# Patient Record
Sex: FEMALE | Race: BLACK OR AFRICAN AMERICAN | NOT HISPANIC OR LATINO | Employment: FULL TIME | URBAN - METROPOLITAN AREA
[De-identification: names, ages, dates, MRNs, and addresses within clinical notes are randomized per-mention and may not be internally consistent; named-entity substitution may affect disease eponyms.]

---

## 2017-07-28 ENCOUNTER — HISTORICAL (OUTPATIENT)
Dept: FAMILY MEDICINE | Facility: CLINIC | Age: 52
End: 2017-07-28

## 2017-07-28 LAB
ALBUMIN SERPL-MCNC: 3.5 GM/DL (ref 3.4–5)
ALBUMIN/GLOB SERPL: 1 RATIO (ref 1–2)
ALP SERPL-CCNC: 101 UNIT/L (ref 45–117)
ALT SERPL-CCNC: 26 UNIT/L (ref 12–78)
AST SERPL-CCNC: 17 UNIT/L (ref 15–37)
BILIRUB SERPL-MCNC: 0.5 MG/DL (ref 0.2–1)
BILIRUBIN DIRECT+TOT PNL SERPL-MCNC: 0.2 MG/DL
BILIRUBIN DIRECT+TOT PNL SERPL-MCNC: 0.3 MG/DL
BUN SERPL-MCNC: 11 MG/DL (ref 7–18)
CALCIUM SERPL-MCNC: 8.1 MG/DL (ref 8.5–10.1)
CHLORIDE SERPL-SCNC: 108 MMOL/L (ref 98–107)
CHOLEST SERPL-MCNC: 117 MG/DL
CHOLEST/HDLC SERPL: 3.1 {RATIO} (ref 0–4.4)
CO2 SERPL-SCNC: 27 MMOL/L (ref 21–32)
CREAT SERPL-MCNC: 1 MG/DL (ref 0.6–1.3)
DEPRECATED CALCIDIOL+CALCIFEROL SERPL-MC: 7.24 NG/ML (ref 30–80)
EST. AVERAGE GLUCOSE BLD GHB EST-MCNC: 128 MG/DL
GLOBULIN SER-MCNC: 4.2 GM/ML (ref 2.3–3.5)
GLUCOSE SERPL-MCNC: 114 MG/DL (ref 74–106)
HBA1C MFR BLD: 6.1 % (ref 4.2–6.3)
HDLC SERPL-MCNC: 38 MG/DL
LDLC SERPL CALC-MCNC: 64 MG/DL (ref 0–130)
POTASSIUM SERPL-SCNC: 3.1 MMOL/L (ref 3.5–5.1)
PROT SERPL-MCNC: 7.7 GM/DL (ref 6.4–8.2)
SODIUM SERPL-SCNC: 144 MMOL/L (ref 136–145)
TRIGL SERPL-MCNC: 77 MG/DL
TSH SERPL-ACNC: 2.51 MIU/L (ref 0.36–3.74)
VLDLC SERPL CALC-MCNC: 15 MG/DL

## 2017-08-31 ENCOUNTER — HISTORICAL (OUTPATIENT)
Dept: RADIOLOGY | Facility: HOSPITAL | Age: 52
End: 2017-08-31

## 2017-08-31 ENCOUNTER — HISTORICAL (OUTPATIENT)
Dept: ADMINISTRATIVE | Facility: HOSPITAL | Age: 52
End: 2017-08-31

## 2017-09-05 ENCOUNTER — HISTORICAL (OUTPATIENT)
Dept: SURGERY | Facility: HOSPITAL | Age: 52
End: 2017-09-05

## 2017-09-05 LAB — POTASSIUM SERPL-SCNC: 3.6 MMOL/L (ref 3.5–5.1)

## 2017-10-25 ENCOUNTER — HISTORICAL (OUTPATIENT)
Dept: OPHTHALMOLOGY | Facility: CLINIC | Age: 52
End: 2017-10-25

## 2017-11-09 ENCOUNTER — HISTORICAL (OUTPATIENT)
Dept: ENDOSCOPY | Facility: HOSPITAL | Age: 52
End: 2017-11-09

## 2017-12-01 ENCOUNTER — HISTORICAL (OUTPATIENT)
Dept: RADIOLOGY | Facility: HOSPITAL | Age: 52
End: 2017-12-01

## 2017-12-17 ENCOUNTER — HISTORICAL (OUTPATIENT)
Dept: SLEEP MEDICINE | Facility: HOSPITAL | Age: 52
End: 2017-12-17

## 2018-02-02 ENCOUNTER — HISTORICAL (OUTPATIENT)
Dept: ADMINISTRATIVE | Facility: HOSPITAL | Age: 53
End: 2018-02-02

## 2018-02-02 LAB — DEPRECATED CALCIDIOL+CALCIFEROL SERPL-MC: 35.03 NG/ML (ref 30–80)

## 2018-03-09 ENCOUNTER — HISTORICAL (OUTPATIENT)
Dept: RADIOLOGY | Facility: HOSPITAL | Age: 53
End: 2018-03-09

## 2018-05-10 ENCOUNTER — HISTORICAL (OUTPATIENT)
Dept: ADMINISTRATIVE | Facility: HOSPITAL | Age: 53
End: 2018-05-10

## 2018-05-10 LAB
APPEARANCE, UA: CLEAR
BACTERIA #/AREA URNS AUTO: ABNORMAL /[HPF]
BILIRUB SERPL-MCNC: NEGATIVE MG/DL
BILIRUB UR QL STRIP: NEGATIVE
BLOOD URINE, POC: NORMAL
CLARITY, POC UA: CLEAR
COLOR UR: ABNORMAL
COLOR, POC UA: YELLOW
GLUCOSE (UA): NORMAL
GLUCOSE UR QL STRIP: NEGATIVE
HGB UR QL STRIP: NEGATIVE
HYALINE CASTS #/AREA URNS LPF: ABNORMAL /[LPF]
KETONES UR QL STRIP: NEGATIVE
KETONES UR QL STRIP: NEGATIVE
LEUKOCYTE EST, POC UA: NORMAL
LEUKOCYTE ESTERASE UR QL STRIP: 250 LEU/UL
NITRITE UR QL STRIP: NEGATIVE
NITRITE, POC UA: NEGATIVE
PH UR STRIP: 6.5 [PH] (ref 4.5–8)
PH, POC UA: 6.5
PROT UR QL STRIP: NEGATIVE
PROTEIN, POC: NEGATIVE
RBC #/AREA URNS AUTO: ABNORMAL /[HPF]
SP GR UR STRIP: 1.02 (ref 1–1.03)
SPECIFIC GRAVITY, POC UA: 1.02
SQUAMOUS #/AREA URNS LPF: ABNORMAL /[LPF]
UROBILINOGEN UR STRIP-ACNC: 2 MG/DL
UROBILINOGEN, POC UA: NORMAL
WBC #/AREA URNS AUTO: ABNORMAL /HPF

## 2018-05-17 ENCOUNTER — HISTORICAL (OUTPATIENT)
Dept: ADMINISTRATIVE | Facility: HOSPITAL | Age: 53
End: 2018-05-17

## 2018-05-17 LAB
APPEARANCE, UA: CLEAR
BACTERIA #/AREA URNS AUTO: ABNORMAL /[HPF]
BILIRUB UR QL STRIP: NEGATIVE
COLOR UR: YELLOW
GLUCOSE (UA): NORMAL
HGB UR QL STRIP: NEGATIVE
HYALINE CASTS #/AREA URNS LPF: ABNORMAL /[LPF]
KETONES UR QL STRIP: ABNORMAL
LEUKOCYTE ESTERASE UR QL STRIP: NEGATIVE
NITRITE UR QL STRIP: NEGATIVE
PH UR STRIP: 6 [PH] (ref 4.5–8)
PROT UR QL STRIP: 20 MG/DL
RBC #/AREA URNS AUTO: ABNORMAL /[HPF]
SP GR UR STRIP: 1.03 (ref 1–1.03)
SQUAMOUS #/AREA URNS LPF: ABNORMAL /[LPF]
UROBILINOGEN UR STRIP-ACNC: 8 MG/DL
WBC #/AREA URNS AUTO: ABNORMAL /HPF

## 2018-05-19 LAB — FINAL CULTURE: NO GROWTH

## 2018-06-05 ENCOUNTER — HISTORICAL (OUTPATIENT)
Dept: ADMINISTRATIVE | Facility: HOSPITAL | Age: 53
End: 2018-06-05

## 2018-06-05 LAB
ALBUMIN SERPL-MCNC: 3.8 GM/DL (ref 3.4–5)
ALBUMIN/GLOB SERPL: 1 RATIO (ref 1–2)
ALP SERPL-CCNC: 98 UNIT/L (ref 45–117)
ALT SERPL-CCNC: 25 UNIT/L (ref 12–78)
AST SERPL-CCNC: 12 UNIT/L (ref 15–37)
BILIRUB SERPL-MCNC: 0.4 MG/DL (ref 0.2–1)
BILIRUBIN DIRECT+TOT PNL SERPL-MCNC: 0.2 MG/DL
BILIRUBIN DIRECT+TOT PNL SERPL-MCNC: 0.2 MG/DL
BUN SERPL-MCNC: 12 MG/DL (ref 7–18)
CALCIUM SERPL-MCNC: 9.2 MG/DL (ref 8.5–10.1)
CHLORIDE SERPL-SCNC: 113 MMOL/L (ref 98–107)
CO2 SERPL-SCNC: 22 MMOL/L (ref 21–32)
CREAT SERPL-MCNC: 0.9 MG/DL (ref 0.6–1.3)
EST. AVERAGE GLUCOSE BLD GHB EST-MCNC: 105 MG/DL
GLOBULIN SER-MCNC: 4.1 GM/ML (ref 2.3–3.5)
GLUCOSE SERPL-MCNC: 93 MG/DL (ref 74–106)
HBA1C MFR BLD: 5.3 % (ref 4.2–6.3)
POTASSIUM SERPL-SCNC: 3.6 MMOL/L (ref 3.5–5.1)
PROT SERPL-MCNC: 7.9 GM/DL (ref 6.4–8.2)
SODIUM SERPL-SCNC: 144 MMOL/L (ref 136–145)
VIT B12 SERPL-MCNC: 225 PG/ML (ref 193–986)

## 2018-07-07 LAB
BILIRUB SERPL-MCNC: NEGATIVE MG/DL
BLOOD URINE, POC: NORMAL
CLARITY, POC UA: NORMAL
COLOR, POC UA: YELLOW
GLUCOSE UR QL STRIP: NEGATIVE
KETONES UR QL STRIP: NEGATIVE
LEUKOCYTE EST, POC UA: NORMAL
NITRITE, POC UA: NEGATIVE
PH, POC UA: 7
PROTEIN, POC: NORMAL
SPECIFIC GRAVITY, POC UA: 1.02
UROBILINOGEN, POC UA: NORMAL

## 2018-08-16 ENCOUNTER — HISTORICAL (OUTPATIENT)
Dept: ADMINISTRATIVE | Facility: HOSPITAL | Age: 53
End: 2018-08-16

## 2018-08-16 LAB
APPEARANCE, UA: ABNORMAL
BACTERIA #/AREA URNS AUTO: ABNORMAL /[HPF]
BILIRUB UR QL STRIP: NEGATIVE
COLOR UR: YELLOW
GLUCOSE (UA): NORMAL
HGB UR QL STRIP: NEGATIVE
HYALINE CASTS #/AREA URNS LPF: ABNORMAL /[LPF]
KETONES UR QL STRIP: NEGATIVE
LEUKOCYTE ESTERASE UR QL STRIP: 500 LEU/UL
NITRITE UR QL STRIP: NEGATIVE
PH UR STRIP: 6 [PH] (ref 4.5–8)
PROT UR QL STRIP: 30 MG/DL
RBC #/AREA URNS AUTO: ABNORMAL /[HPF]
SP GR UR STRIP: 1.02 (ref 1–1.03)
SQUAMOUS #/AREA URNS LPF: >100 /[LPF]
UROBILINOGEN UR STRIP-ACNC: 4 MG/DL
WBC #/AREA URNS AUTO: >=100 /HPF

## 2018-12-21 ENCOUNTER — HISTORICAL (OUTPATIENT)
Dept: ADMINISTRATIVE | Facility: HOSPITAL | Age: 53
End: 2018-12-21

## 2019-01-04 ENCOUNTER — HISTORICAL (OUTPATIENT)
Dept: FAMILY MEDICINE | Facility: CLINIC | Age: 54
End: 2019-01-04

## 2019-01-04 LAB
CHOLEST SERPL-MCNC: 147 MG/DL
CHOLEST/HDLC SERPL: 3.5 {RATIO} (ref 0–4.4)
EST. AVERAGE GLUCOSE BLD GHB EST-MCNC: 140 MG/DL
HBA1C MFR BLD: 6.5 % (ref 4.2–6.3)
HDLC SERPL-MCNC: 42 MG/DL
LDLC SERPL CALC-MCNC: 87 MG/DL (ref 0–130)
TRIGL SERPL-MCNC: 91 MG/DL
VLDLC SERPL CALC-MCNC: 18 MG/DL

## 2019-03-21 ENCOUNTER — HISTORICAL (OUTPATIENT)
Dept: ADMINISTRATIVE | Facility: HOSPITAL | Age: 54
End: 2019-03-21

## 2019-03-21 LAB
CREAT UR-MCNC: 180 MG/DL
MICROALBUMIN UR-MCNC: 9.6 MG/L (ref 0–19)
MICROALBUMIN/CREAT RATIO PNL UR: 5.3 MCG/MG CR (ref 0–29)

## 2019-06-03 LAB
BILIRUB SERPL-MCNC: NEGATIVE MG/DL
BLOOD URINE, POC: NORMAL
CLARITY, POC UA: CLEAR
COLOR, POC UA: YELLOW
GLUCOSE UR QL STRIP: NEGATIVE
KETONES UR QL STRIP: NEGATIVE
LEUKOCYTE EST, POC UA: NEGATIVE
NITRITE, POC UA: NEGATIVE
PH, POC UA: 5.5
PROTEIN, POC: NEGATIVE
SPECIFIC GRAVITY, POC UA: 1.02
UROBILINOGEN, POC UA: NORMAL

## 2019-06-14 ENCOUNTER — HISTORICAL (OUTPATIENT)
Dept: INTERNAL MEDICINE | Facility: CLINIC | Age: 54
End: 2019-06-14

## 2019-06-14 LAB
EST. AVERAGE GLUCOSE BLD GHB EST-MCNC: 126 MG/DL
HBA1C MFR BLD: 6 % (ref 4.2–6.3)

## 2019-07-11 ENCOUNTER — HISTORICAL (OUTPATIENT)
Dept: RADIOLOGY | Facility: HOSPITAL | Age: 54
End: 2019-07-11

## 2019-07-11 LAB
ALBUMIN SERPL-MCNC: 3.6 GM/DL (ref 3.4–5)
ALBUMIN/GLOB SERPL: 0.9 RATIO (ref 1.1–2)
ALP SERPL-CCNC: 97 UNIT/L (ref 45–117)
ALT SERPL-CCNC: 31 UNIT/L (ref 12–78)
AST SERPL-CCNC: 18 UNIT/L (ref 15–37)
BILIRUB SERPL-MCNC: 0.4 MG/DL (ref 0.2–1)
BILIRUBIN DIRECT+TOT PNL SERPL-MCNC: 0.1 MG/DL
BILIRUBIN DIRECT+TOT PNL SERPL-MCNC: 0.3 MG/DL
BUN SERPL-MCNC: 18 MG/DL (ref 7–18)
CALCIUM SERPL-MCNC: 8.8 MG/DL (ref 8.5–10.1)
CHLORIDE SERPL-SCNC: 114 MMOL/L (ref 98–107)
CO2 SERPL-SCNC: 25 MMOL/L (ref 21–32)
CREAT SERPL-MCNC: 1 MG/DL (ref 0.6–1.3)
GLOBULIN SER-MCNC: 4.1 GM/ML (ref 2.3–3.5)
GLUCOSE SERPL-MCNC: 106 MG/DL (ref 74–106)
POTASSIUM SERPL-SCNC: 3.8 MMOL/L (ref 3.5–5.1)
PROT SERPL-MCNC: 7.7 GM/DL (ref 6.4–8.2)
SODIUM SERPL-SCNC: 145 MMOL/L (ref 136–145)

## 2019-09-20 ENCOUNTER — HISTORICAL (OUTPATIENT)
Dept: RADIOLOGY | Facility: HOSPITAL | Age: 54
End: 2019-09-20

## 2020-06-10 ENCOUNTER — HISTORICAL (OUTPATIENT)
Dept: CARDIOLOGY | Facility: HOSPITAL | Age: 55
End: 2020-06-10

## 2020-06-12 ENCOUNTER — HISTORICAL (OUTPATIENT)
Dept: ADMINISTRATIVE | Facility: HOSPITAL | Age: 55
End: 2020-06-12

## 2020-06-12 LAB
BUN SERPL-MCNC: 14 MG/DL (ref 7–18)
CALCIUM SERPL-MCNC: 8.8 MG/DL (ref 8.5–10.1)
CHLORIDE SERPL-SCNC: 112 MMOL/L (ref 98–107)
CHOLEST SERPL-MCNC: 126 MG/DL
CHOLEST/HDLC SERPL: 3.2 {RATIO} (ref 0–4.4)
CO2 SERPL-SCNC: 27 MMOL/L (ref 21–32)
CREAT SERPL-MCNC: 0.9 MG/DL (ref 0.6–1.3)
CREAT UR-MCNC: 273 MG/DL
CREAT/UREA NIT SERPL: 16
EST. AVERAGE GLUCOSE BLD GHB EST-MCNC: 171 MG/DL
GLUCOSE SERPL-MCNC: 108 MG/DL (ref 74–106)
HBA1C MFR BLD: 7.6 % (ref 4.2–6.3)
HDLC SERPL-MCNC: 40 MG/DL (ref 40–59)
LDLC SERPL CALC-MCNC: 70 MG/DL
POTASSIUM SERPL-SCNC: 3.5 MMOL/L (ref 3.5–5.1)
PROT UR STRIP-MCNC: 25.7 MG/DL
PROT/CREAT UR-RTO: 94.1 MG/GM
SODIUM SERPL-SCNC: 145 MMOL/L (ref 136–145)
TRIGL SERPL-MCNC: 82 MG/DL
VLDLC SERPL CALC-MCNC: 16 MG/DL

## 2020-07-06 ENCOUNTER — HISTORICAL (OUTPATIENT)
Dept: ADMINISTRATIVE | Facility: HOSPITAL | Age: 55
End: 2020-07-06

## 2020-07-17 ENCOUNTER — HISTORICAL (OUTPATIENT)
Dept: RADIOLOGY | Facility: HOSPITAL | Age: 55
End: 2020-07-17

## 2020-08-18 ENCOUNTER — HISTORICAL (OUTPATIENT)
Dept: ADMINISTRATIVE | Facility: HOSPITAL | Age: 55
End: 2020-08-18

## 2020-08-20 LAB — FINAL CULTURE: NORMAL

## 2021-01-03 ENCOUNTER — HISTORICAL (OUTPATIENT)
Dept: ADMINISTRATIVE | Facility: HOSPITAL | Age: 56
End: 2021-01-03

## 2021-01-03 LAB
FLUAV AG UPPER RESP QL IA.RAPID: NEGATIVE
FLUBV AG UPPER RESP QL IA.RAPID: NEGATIVE
SARS-COV-2 RNA RESP QL NAA+PROBE: DETECTED

## 2021-01-26 ENCOUNTER — HISTORICAL (OUTPATIENT)
Dept: ADMINISTRATIVE | Facility: HOSPITAL | Age: 56
End: 2021-01-26

## 2021-02-08 ENCOUNTER — HISTORICAL (OUTPATIENT)
Dept: FAMILY MEDICINE | Facility: CLINIC | Age: 56
End: 2021-02-08

## 2021-02-08 LAB
ALBUMIN SERPL-MCNC: 3.8 GM/DL (ref 3.5–5)
ALBUMIN/GLOB SERPL: 1 RATIO (ref 1.1–2)
ALP SERPL-CCNC: 93 UNIT/L (ref 40–150)
ALT SERPL-CCNC: 21 UNIT/L (ref 0–55)
AST SERPL-CCNC: 17 UNIT/L (ref 5–34)
BILIRUB SERPL-MCNC: 0.5 MG/DL
BILIRUBIN DIRECT+TOT PNL SERPL-MCNC: 0.2 MG/DL (ref 0–0.5)
BILIRUBIN DIRECT+TOT PNL SERPL-MCNC: 0.3 MG/DL (ref 0–0.8)
BUN SERPL-MCNC: 14 MG/DL (ref 9.8–20.1)
CALCIUM SERPL-MCNC: 9.5 MG/DL (ref 8.4–10.2)
CHLORIDE SERPL-SCNC: 111 MMOL/L (ref 98–107)
CHOLEST SERPL-MCNC: 175 MG/DL
CHOLEST/HDLC SERPL: 4 {RATIO} (ref 0–5)
CO2 SERPL-SCNC: 25 MMOL/L (ref 22–29)
CREAT SERPL-MCNC: 0.92 MG/DL (ref 0.55–1.02)
DEPRECATED CALCIDIOL+CALCIFEROL SERPL-MC: 38.4 NG/ML (ref 30–80)
EST. AVERAGE GLUCOSE BLD GHB EST-MCNC: 119.8 MG/DL
GLOBULIN SER-MCNC: 3.8 GM/DL (ref 2.4–3.5)
GLUCOSE SERPL-MCNC: 94 MG/DL (ref 74–100)
HBA1C MFR BLD: 5.8 %
HDLC SERPL-MCNC: 44 MG/DL (ref 35–60)
LDLC SERPL CALC-MCNC: 119 MG/DL (ref 50–140)
POTASSIUM SERPL-SCNC: 3.4 MMOL/L (ref 3.5–5.1)
PROT SERPL-MCNC: 7.6 GM/DL (ref 6.4–8.3)
SODIUM SERPL-SCNC: 144 MMOL/L (ref 136–145)
TRIGL SERPL-MCNC: 61 MG/DL (ref 37–140)
TSH SERPL-ACNC: 0.92 UIU/ML (ref 0.35–4.94)
VLDLC SERPL CALC-MCNC: 12 MG/DL

## 2021-03-08 ENCOUNTER — HISTORICAL (OUTPATIENT)
Dept: ADMINISTRATIVE | Facility: HOSPITAL | Age: 56
End: 2021-03-08

## 2021-03-08 LAB — CK SERPL-CCNC: 183 U/L (ref 29–168)

## 2021-04-05 ENCOUNTER — HISTORICAL (OUTPATIENT)
Dept: ADMINISTRATIVE | Facility: HOSPITAL | Age: 56
End: 2021-04-05

## 2021-07-31 ENCOUNTER — HISTORICAL (OUTPATIENT)
Dept: ADMINISTRATIVE | Facility: HOSPITAL | Age: 56
End: 2021-07-31

## 2021-07-31 LAB
FLUAV AG UPPER RESP QL IA.RAPID: NEGATIVE
FLUBV AG UPPER RESP QL IA.RAPID: NEGATIVE
SARS-COV-2 RNA RESP QL NAA+PROBE: NOT DETECTED
STREP A PCR (OHS): NOT DETECTED

## 2021-11-10 ENCOUNTER — HISTORICAL (OUTPATIENT)
Dept: ADMINISTRATIVE | Facility: HOSPITAL | Age: 56
End: 2021-11-10

## 2021-11-10 LAB
FLUAV AG UPPER RESP QL IA.RAPID: NEGATIVE
FLUBV AG UPPER RESP QL IA.RAPID: NEGATIVE
SARS-COV-2 RNA RESP QL NAA+PROBE: NEGATIVE
SARS-COV-2 RNA RESP QL NAA+PROBE: NOT DETECTED

## 2021-11-19 ENCOUNTER — HISTORICAL (OUTPATIENT)
Dept: FAMILY MEDICINE | Facility: CLINIC | Age: 56
End: 2021-11-19

## 2021-11-19 LAB
ABS NEUT (OLG): 3.18 X10(3)/MCL (ref 2.1–9.2)
ALBUMIN SERPL-MCNC: 3.7 GM/DL (ref 3.5–5)
ALBUMIN/GLOB SERPL: 1 RATIO (ref 1.1–2)
ALP SERPL-CCNC: 73 UNIT/L (ref 40–150)
ALT SERPL-CCNC: 22 UNIT/L (ref 0–55)
AST SERPL-CCNC: 15 UNIT/L (ref 5–34)
BASOPHILS # BLD AUTO: 0.1 X10(3)/MCL (ref 0–0.2)
BASOPHILS NFR BLD AUTO: 1 %
BILIRUB SERPL-MCNC: 0.5 MG/DL
BILIRUBIN DIRECT+TOT PNL SERPL-MCNC: 0.2 MG/DL (ref 0–0.5)
BILIRUBIN DIRECT+TOT PNL SERPL-MCNC: 0.3 MG/DL (ref 0–0.8)
BUN SERPL-MCNC: 11.4 MG/DL (ref 9.8–20.1)
CALCIUM SERPL-MCNC: 9.5 MG/DL (ref 8.7–10.5)
CHLORIDE SERPL-SCNC: 110 MMOL/L (ref 98–107)
CHOLEST SERPL-MCNC: 185 MG/DL
CHOLEST/HDLC SERPL: 5 {RATIO} (ref 0–5)
CO2 SERPL-SCNC: 27 MMOL/L (ref 22–29)
CREAT SERPL-MCNC: 0.88 MG/DL (ref 0.55–1.02)
DEPRECATED CALCIDIOL+CALCIFEROL SERPL-MC: 46.3 NG/ML (ref 30–80)
EOSINOPHIL # BLD AUTO: 0.4 X10(3)/MCL (ref 0–0.9)
EOSINOPHIL NFR BLD AUTO: 6 %
ERYTHROCYTE [DISTWIDTH] IN BLOOD BY AUTOMATED COUNT: 14.6 % (ref 11.5–14.5)
EST. AVERAGE GLUCOSE BLD GHB EST-MCNC: 122.6 MG/DL
GLOBULIN SER-MCNC: 3.8 GM/DL (ref 2.4–3.5)
GLUCOSE SERPL-MCNC: 106 MG/DL (ref 74–100)
HBA1C MFR BLD: 5.9 %
HCT VFR BLD AUTO: 43.7 % (ref 35–46)
HDLC SERPL-MCNC: 36 MG/DL (ref 35–60)
HGB BLD-MCNC: 13.2 GM/DL (ref 12–16)
IMM GRANULOCYTES # BLD AUTO: 0.03 10*3/UL
IMM GRANULOCYTES NFR BLD AUTO: 0 %
LDLC SERPL CALC-MCNC: 127 MG/DL (ref 50–140)
LYMPHOCYTES # BLD AUTO: 2.7 X10(3)/MCL (ref 0.6–4.6)
LYMPHOCYTES NFR BLD AUTO: 40 %
MCH RBC QN AUTO: 26.2 PG (ref 26–34)
MCHC RBC AUTO-ENTMCNC: 30.2 GM/DL (ref 31–37)
MCV RBC AUTO: 86.9 FL (ref 80–100)
MONOCYTES # BLD AUTO: 0.4 X10(3)/MCL (ref 0.1–1.3)
MONOCYTES NFR BLD AUTO: 6 %
NEUTROPHILS # BLD AUTO: 3.18 X10(3)/MCL (ref 2.1–9.2)
NEUTROPHILS NFR BLD AUTO: 47 %
NRBC BLD AUTO-RTO: 0 % (ref 0–0.2)
PLATELET # BLD AUTO: 305 X10(3)/MCL (ref 130–400)
PMV BLD AUTO: 9.9 FL (ref 7.4–10.4)
POTASSIUM SERPL-SCNC: 3.9 MMOL/L (ref 3.5–5.1)
PROT SERPL-MCNC: 7.5 GM/DL (ref 6.4–8.3)
RBC # BLD AUTO: 5.03 X10(6)/MCL (ref 4–5.2)
SODIUM SERPL-SCNC: 145 MMOL/L (ref 136–145)
T4 FREE SERPL-MCNC: 0.86 NG/DL (ref 0.7–1.48)
TRIGL SERPL-MCNC: 110 MG/DL (ref 37–140)
TSH SERPL-ACNC: 1.03 UIU/ML (ref 0.35–4.94)
VLDLC SERPL CALC-MCNC: 22 MG/DL
WBC # SPEC AUTO: 6.8 X10(3)/MCL (ref 4.5–11)

## 2021-12-23 ENCOUNTER — HISTORICAL (OUTPATIENT)
Dept: RADIOLOGY | Facility: HOSPITAL | Age: 56
End: 2021-12-23

## 2021-12-30 ENCOUNTER — HISTORICAL (OUTPATIENT)
Dept: ADMINISTRATIVE | Facility: HOSPITAL | Age: 56
End: 2021-12-30

## 2021-12-30 LAB — SARS-COV-2 RNA RESP QL NAA+PROBE: POSITIVE

## 2022-04-10 ENCOUNTER — HISTORICAL (OUTPATIENT)
Dept: ADMINISTRATIVE | Facility: HOSPITAL | Age: 57
End: 2022-04-10
Payer: MEDICAID

## 2022-04-30 VITALS
WEIGHT: 293 LBS | OXYGEN SATURATION: 98 % | BODY MASS INDEX: 50.02 KG/M2 | DIASTOLIC BLOOD PRESSURE: 92 MMHG | WEIGHT: 293 LBS | WEIGHT: 293 LBS | DIASTOLIC BLOOD PRESSURE: 82 MMHG | BODY MASS INDEX: 50.02 KG/M2 | WEIGHT: 293 LBS | SYSTOLIC BLOOD PRESSURE: 118 MMHG | SYSTOLIC BLOOD PRESSURE: 139 MMHG | SYSTOLIC BLOOD PRESSURE: 138 MMHG | SYSTOLIC BLOOD PRESSURE: 114 MMHG | OXYGEN SATURATION: 94 % | HEIGHT: 64 IN | BODY MASS INDEX: 48.82 KG/M2 | HEIGHT: 64 IN | DIASTOLIC BLOOD PRESSURE: 70 MMHG | WEIGHT: 293 LBS | SYSTOLIC BLOOD PRESSURE: 97 MMHG | DIASTOLIC BLOOD PRESSURE: 88 MMHG | OXYGEN SATURATION: 97 % | SYSTOLIC BLOOD PRESSURE: 148 MMHG | DIASTOLIC BLOOD PRESSURE: 63 MMHG | BODY MASS INDEX: 50.02 KG/M2 | OXYGEN SATURATION: 96 % | BODY MASS INDEX: 51.91 KG/M2 | DIASTOLIC BLOOD PRESSURE: 76 MMHG | HEIGHT: 64 IN | WEIGHT: 293 LBS | HEIGHT: 65 IN | SYSTOLIC BLOOD PRESSURE: 140 MMHG | HEIGHT: 63 IN | BODY MASS INDEX: 50.02 KG/M2 | DIASTOLIC BLOOD PRESSURE: 81 MMHG | HEIGHT: 64 IN | OXYGEN SATURATION: 98 % | OXYGEN SATURATION: 100 %

## 2022-04-30 NOTE — PROGRESS NOTES
Patient:   Rosalee Prince             MRN: [id number]            FIN: [id number]               Age:   52 years     Sex:  Female     :  1965   Associated Diagnoses:   None   Author:   Diane Barnard MD      Chief Complaint   2018 14:36 CDT      frequent urination and had bladder infect last thursday at urgent care        History of Present Illness   53yo AAF with PMHx of HTN, pseudotumor cerebri  seen at urgent care 5/10/18  diagnosed with UTI, given macrobin, pyridium & flexeril  completed antibiotics  pain never stopped in right lower back  dysuria had resolved, however having more urinary frequency & burning since yesterday  not noticing blood on toilet paper like initially   drinking mayber 4-5 glasses of water/day  does not drink sodas, denies other caffeine use      Review of Systems   Constitutional:  No fever, No chills.    Respiratory:  No shortness of breath, No cough.    Cardiovascular:  No chest pain.    Gastrointestinal:  No nausea, No vomiting, No diarrhea, No constipation, No abdominal pain.    Genitourinary:  Dysuria, No hematuria.    Musculoskeletal:  Back pain.    Neurologic:  No headache.       Physical Examination   Vital Signs   2018 14:36 CDT      Temperature Oral          36.7 DegC                             Temperature Oral (calculated)             98.06 DegF                             Peripheral Pulse Rate     81 bpm                             Respiratory Rate          20 br/min                             SpO2                      98 %                             Systolic Blood Pressure   114 mmHg                             Diastolic Blood Pressure  76 mmHg     General:  Alert and oriented, No acute distress.    Respiratory:  Lungs are clear to auscultation, Respirations are non-labored, Breath sounds are equal, Symmetrical chest wall expansion.    Cardiovascular:  Normal rate, Regular rhythm, No murmur.    Gastrointestinal:  Soft, Non-tender, Non-distended,  Normal bowel sounds.    Genitourinary:  No costovertebral angle tenderness.    Musculoskeletal:  moves all extremitities appropriately & with ease.    Integumentary:  Warm, Dry, Intact.    Neurologic:  No focal deficits.    Psychiatric:  Cooperative.       Impression and Plan   A/P: 53yo AAF    1. UTI  - will treat symptomatically  - Bactrim DS 160mg BID x 7 days  - encourage good hydration  - follow up UA/urine culture & sensitivity; will call with results and DC antibiotics if culture negative    RTC 2 weeks with PCP

## 2022-04-30 NOTE — PROGRESS NOTES
Patient:   Rosalee Prince             MRN: [id number]            FIN: [id number]               Age:   52 years     Sex:  Female     :  1965   Associated Diagnoses:   None   Author:   Moreno Jiang MD      Peer to peer performed, patient qualifies for in house sleep study. Authorization  number provided is [id number]. This authorization is good until .

## 2022-05-02 NOTE — HISTORICAL [facility] CERNER
This is a historical note converted from [place]. Formatting and pictures may have been removed.  Please reference [place] for original formatting and attached multimedia. Chief Complaint  Knee injections  History of Present Illness  bilateral knee pain, last injections lasted till the beginning of this month approximately 2 months; denies any trauma, denies pain elsewhere, no falls, does not feel like legs are giving out on her; did not affect her CBGs last injections  ?  last injections 10/29/20  Review of Systems  -fever, -chills, -fatigue  -Chest pain, -SOB  -n/v/d/c, -abdominal pain  Physical Exam  Vitals & Measurements  T:?36.8? ?C (Oral)? HR:?60(Peripheral)? RR:?18? BP:?138/81? SpO2:?96%?  HT:?162.00?cm? WT:?164.000?kg? BMI:?62.49?  General: well appearing, pleasant  Left Knee: tender anterior joint lines, neg ant drawer, neg post drawer, neg valgus and varus force, strength 5/5 flexion and extension; no swelling, no erythema, no effusion  Right Knee: tender anterior joint lines, neg ant drawer, neg post drawer, neg valgus and varus force, strength 5/5 flexion and extension; no swelling, no erythema, no effusion  ?  Procedure?  Joint injection procedure?  Confirmed: patient, procedure, side, site, safety procedures followed. ??  Supervised?by:?Dr. Saunders  Informed consent: signed by patient. ??  Indication: symptomatic relief of?bilateral?knee?pain. ??  Location:?left and right?knee  Preparation and technique: skin prep chlorhexidine gluconate (Hibiclens) scrub, sterile preparation of site in usual fashion, local anesthesia vapocoolant, approach anterolateral, sterile needle used (size #25??gauge, length 1.5 inch)?  Joint injected: with?4 mL of 1% lidocaine plain with?40?mg of kenalog. ??  Procedure tolerated: well. ??  No Complications.  Blood Loss: none ??  ?  Counseled: ?The patient regarding diagnosis, treatment, and medications.??  ?   PE Post Injection  Post Injection Evaluation: patient reports decrease  in pain  Assessment/Plan  1.?Osteoarthritis of knees, bilateral?M17.0  -bilateral knee injections today  -Bilateral xrays ordered and patient instructed to?go to radiology today  -Left xray from 6/2020 reviewed; Right xray from 12/2018 reviewed  Ordered:  Lidocaine inj., 10 mL, form: Soln, Intra-Articular, Once, first dose 01/26/21 10:27:00 CST, stop date 01/26/21 10:27:00 CST  Clinic Follow up, *Est. 04/26/21 3:00:00 CDT, with PCP Dr. Laboy, Order for future visit, Osteoarthritis of knees, bilateral, [facility]  XR Knee Left 4 or More Views, Routine, 01/26/21 9:51:00 CST, None, Ambulatory, Rad Type, Order for future visit, Osteoarthritis of knees, bilateral, Not Scheduled, 01/26/21 9:51:00 CST  XR Knee Right 4 or More Views, Routine, 01/26/21 9:51:00 CST, None, Ambulatory, Rad Type, Order for future visit, Osteoarthritis of knees, bilateral, Not Scheduled, 01/26/21 9:51:00 CST  ?  RTC 3 months with PCP for routine followup  ?  Jovana Anne,   [employer] [facility] Resident HO-2  Referrals  Clinic Follow up, *Est. 04/26/21 3:00:00 CDT, with PCP Dr. Laboy, Order for future visit, Osteoarthritis of knees, bilateral, [facility]   Problem List/Past Medical History  Ongoing  Controlled diabetes mellitus  HTN (hypertension)(  Confirmed  )  Intracranial hypertension  Leg edema  Morbid obesity  Osteoarthritis of left hip  Spasm of muscle(  Confirmed  )  Well controlled diabetes mellitus  Procedure/Surgical History  Colorectal cancer screening; flexible sigmoidoscopy (11/09/2017)  Inspection of Lower Intestinal Tract, Via Natural or Artificial Opening Endoscopic (11/09/2017)  Sigmoidoscopy (11/09/2017)  Drainage of Spinal Canal, Percutaneous Approach, Diagnostic (09/05/2017)  Spinal puncture, lumbar, diagnostic (09/05/2017)  Drainage of Spinal Canal, Percutaneous Approach, Diagnostic (08/09/2017)  Spinal puncture, lumbar, diagnostic (08/09/2017)  GALL BLADDER  T&A  MILTON - Total abdominal  hysterectomy  TUB. LIGATION.   Medications  acetaZOLAMIDE 500 mg oral capsule, extended release, See Instructions  albuterol 0.083% inhalation solution, 2.5 mg= 3 mL, NEB, q4hr  albuterol CFC free 90 mcg/inh inhalation aerosol with adapter, See Instructions, 4 refills  amlodipine 5 mg oral tablet, See Instructions  Arnuity Ellipta 100 mcg inhalation powder, See Instructions  atorvastatin 20 mg oral tablet, 20 mg= 1 tab(s), Oral, Daily, 3 refills  diclofenac 1% topical gel, TOP, QID  diclofenac sodium 75 mg oral delayed release tablet, 75 mg= 1 tab(s), Oral, BID  Glucometer lancets, See Instructions, 3 refills  Glucometer Test Strips, See Instructions, 3 refills  hydroCHLOROthiazide 12.5 mg oral capsule, See Instructions  Lidocaine 1% 10ml inj, 10 mL, Intra-Articular, Once  metformin 500 mg oral tablet, See Instructions  mupirocin 2% topical ointment, 1 maxx, Both Nostrils, TID  Nebulizer device, See Instructions  polyethylene glycol 3350 oral powder for reconstitution, 17 gm, Oral, Daily, 1 refills  True Metrix Glucometer, See Instructions  valsartan 160 mg oral tablet, See Instructions  Allergies  No Known Allergies  No Known Medication Allergies  Family History  CAD (coronary artery disease): Mother and Father.  Diabetes mellitus type 1.: Sister.  Hodgkins disease: Sister.  Renal failure syndrome.: Mother.  Stroke: Mother and Sister.  Tobacco abuse: Mother and Father.      ?  I reviewed History, PE, A/P and chart was reviewed.  I agree with resident, care reasonable and appropriate.?  resident messaged to place lab orders and notify patient  MA messaged to schedule patient for check up for DM and chronic conditions

## 2022-05-02 NOTE — HISTORICAL [facility] CERNER
This is a historical note converted from [place]. Formatting and pictures may have been removed.  Please reference [place] for original formatting and attached multimedia. Chief Complaint  here for injection left knee.  History of Present Illness  55yo PMHx HTN, OA left hip, Asthma, CTS presents to clinic for injection in left knee.  ?   Received steroid injection in Right knee 6/23/20 without issues, right knee is doing well less pain, walking has been easier, blood sugar did increase after injection. Left knee hurts all the time, lateral and across mostly, the pain keeps her from doing daily activities and limits what she can do, pain today 3/10, but hasnt been moving around today. States pain worse when on her feet and walking around, better with rest, denies stiffness on standing.  Review of Systems  Constitutional:??-fever, -chills, -fatigue??  Musculoskeletal:?left knee pain, left hip pain  Integumentary:??-rash, -breakdown  Neurologic:?-focal deficits, -numbness, -tingling  Physical Exam  Vitals & Measurements  T:?36.8? ?C (Oral)? HR:?53(Peripheral)? RR:?20? BP:?118/70? SpO2:?100%?  HT:?162?cm? WT:?155.80?kg? BMI:?59.37?  General: appears well, in no acute distress, obese, limping gait  Cardiovascular: RRR, no murmurs  Respiration: nonlabored on room air, clear to auscultation  Musculoskeletal:?Left Knee: tender lateral joint line, no erythema, no swelling, no atrophy, no effusion, no warmth, no crepitus; anterior drawer negative,?posterior drawer negative, valgus and varus negative  ?   Procedure?  Joint injection procedure?  Confirmed: patient, procedure, side, site, safety procedures followed. ??  Supervised?by:?Dr. Goodson  Informed consent: signed by patient. ??  Indication: symptomatic relief of?left?knee?pain. ??  Location:??left?knee  Preparation and technique: skin prep chlorhexidine gluconate (Hibiclens) scrub, sterile preparation of site in usual fashion, local anesthesia vapocoolant, approach  anterolateral, sterile needle used (size # 21 ?gauge, length 1.5 inch)?  Joint injected: with?4 mL of 1% lidocaine plain with?40?mg of kenalog. ??  Procedure tolerated: well. ??  No Complications.  Blood Loss: none ??  ?  Counseled: ?The patient regarding diagnosis, treatment, and medications. ?  Patient Instructions: ?given at time of discharge; ER and RTC instructions given?  ?   PE Post Injection  Post Injection Evaluation: patient reports decrease in pain; improvement in ROM; NVI post procedure  ?  Assessment/Plan  1.?Knee pain, left?M25.562  -knee injection today  -XR today reviewed pending official read; my interpretation: narrowing joint space, hyper-sclerotic changes to medial and lateral compartments  -Educated on post procedure care, and discussed monitoring blood sugars  -likely will need to space out steroid injections in the future ad possible hyaluronate injections in the future  Ordered:  Lidocaine inj., 4 mL, form: Injection, IM, Once-Unscheduled, first dose 07/06/20 10:18:00 CDT  triamcinolone, 40 mg, form: Injection, IM, Once-Unscheduled, first dose 07/06/20 10:17:00 CDT  Clinic Follow up, *Est. 10/06/20 3:00:00 CDT, with PCP Iris, Order for future visit, Knee pain, left, [facility]  XR Knee Left 1 or 2 Views, Routine, 07/06/20 9:01:00 CDT, Pain, None, Ambulatory, M25.562, Not Scheduled, 07/06/20 9:01:00 CDT  ?  RTC in 3 months with PCP for routine health maintenance and repeat knee injections.  ?  Jovana Anne DO  [facility] Resident HO-2  Referrals  Clinic Follow up, *Est. 10/06/20 3:00:00 CDT, with PCP Iris, Order for future visit, Knee pain, left, [facility]   Problem List/Past Medical History  Ongoing  Allergic rhinitis(  Confirmed  )  Asthma  Calcaneal spur(  Confirmed  )  Carpal tunnel syndrome, bilateral  Controlled diabetes mellitus  Depression  HTN (hypertension)(  Confirmed  )  Intracranial hypertension  Leg edema  Morbid  obesity  Osteoarthritis of left hip  Spasm of muscle(  Confirmed  )  Well controlled diabetes mellitus  Historical  Essential hypertension  Procedure/Surgical History  Colorectal cancer screening; flexible sigmoidoscopy (11/09/2017)  Inspection of Lower Intestinal Tract, Via Natural or Artificial Opening Endoscopic (11/09/2017)  Sigmoidoscopy (11/09/2017)  Drainage of Spinal Canal, Percutaneous Approach, Diagnostic (09/05/2017)  Spinal puncture, lumbar, diagnostic (09/05/2017)  Drainage of Spinal Canal, Percutaneous Approach, Diagnostic (08/09/2017)  Spinal puncture, lumbar, diagnostic (08/09/2017)  GALL BLADDER  T&A  [facility]ILTON - Total abdominal hysterectomy  TUB. LIGATION.   Medications  albuterol CFC free 90 mcg/inh inhalation aerosol with adapter, See Instructions, 4 refills  Arnuity Ellipta 100 mcg inhalation powder, See Instructions, 4 refills  atorvastatin 20 mg oral tablet, 20 mg= 1 tab(s), Oral, Daily, 3 refills  Diamox Sequels 500 mg oral capsule, extended release, 500 mg= 1 cap(s), Oral, BID, 3 refills  Glucometer lancets, See Instructions, 3 refills  Glucometer Test Strips, See Instructions, 3 refills  hydroCHLOROthiazide 12.5 mg oral capsule, See Instructions  Kenalog-40, 40 mg= 1 mL, IM, Once-Unscheduled  Lidocaine 1% PF 5ml inj, 4 mL, IM, Once-Unscheduled  metformin 500 mg oral tablet, 500 mg= 1 tab(s), Oral, BID, 2 refills  Nebulizer device, See Instructions  Norvasc 5 mg oral tablet, 5 mg= 1 tab(s), Oral, Daily  polyethylene glycol 3350 oral powder for reconstitution, 17 gm, Oral, Daily, 1 refills  True Metrix Glucometer, See Instructions  valsartan 160 mg oral tablet, See Instructions, 2 refills  Voltaren 1% topical gel, 1 maxx, TOP, QID, PRN,? ?Not taking  Allergies  No Known Medication Allergies  Social History  Abuse/Neglect  No, No, 07/06/2020  No, No, Yes, 06/12/2020  No, 06/03/2020  Alcohol - Low Risk, 04/26/2015  Never, 10/18/2019  Employment/School  Employed, 12/21/2018  Exercise  Exercise  duration: 0., 12/21/2018  Home/Environment  Lives with Spouse, grandchildren. Alcohol abuse in household: No. Substance abuse in household: No. Smoker in household: No. Injuries/Abuse/Neglect in household: No. Feels unsafe at home: No. Safe place to go: No. Agency(s)/Others notified: No. TV/Computer concerns: No., 12/21/2018  Nutrition/Health  Regular, 12/21/2018  Sexual  Gender Identity Identifies as female., 10/22/2019  Spiritual/Cultural  Synagogue, 10/18/2019  Substance Use - Denies Substance Abuse, 04/26/2015  Never, Household substance abuse concerns: No., 07/06/2020  Tobacco - Denies Tobacco Use, 07/01/2015  Former smoker, quit more than 30 days ago, N/A, Household tobacco concerns: No. Smokeless Tobacco Use: Never., 07/06/2020  Family History  CAD (coronary artery disease): Mother and Father.  Diabetes mellitus type 1.: Sister.  Hodgkins disease: Sister.  Renal failure syndrome.: Mother.  Stroke: Mother and Sister.  Tobacco abuse: Mother and Father.  Immunizations  Vaccine Date Status   influenza virus vaccine, inactivated 10/18/2019 Given   tetanus-diphtheria toxoids 06/20/2019 Given   influenza virus vaccine, inactivated 11/02/2018 Given   influenza virus vaccine, inactivated 03/08/2018 Given   influenza virus vaccine, inactivated 10/23/2014 Recorded   Health Maintenance  Health Maintenance  ???Pending?(in the next year)  ??? ??OverDue  ??? ? ? ?Diabetes Maintenance-Microalbumin due??and every?  ??? ? ? ?Hypertension Management-Education due??11/11/17??and every 1??year(s)  ??? ? ? ?Asthma Management-Spirometry due??03/09/19??and every 1??year(s)  ??? ??Due?  ??? ? ? ?Aspirin Therapy for CVD Prevention due??07/06/20??and every 1??year(s)  ??? ? ? ?Asthma Management-Asthma Education due??07/06/20??and every 6??month(s)  ??? ? ? ?Asthma Management-[place] Flow due??07/06/20??Variable frequency  ??? ? ? ?Asthma Management-Written Action Plan due??07/06/20??and every 6??month(s)  ??? ??Due In Future?  ??? ?  ? ?Diabetes Maintenance-Eye Exam not due until??10/21/20??and every 1??year(s)  ??? ? ? ?Alcohol Misuse Screening not due until??01/01/21??and every 1??year(s)  ??? ? ? ?Obesity Screening not due until??01/01/21??and every 1??year(s)  ??? ? ? ?Diabetes Maintenance-Foot Exam not due until??06/12/21??and every 1??year(s)  ??? ? ? ?Diabetes Maintenance-HgbA1c not due until??06/12/21??and every 1??year(s)  ??? ? ? ?Hypertension Management-BMP not due until??06/12/21??and every 1??year(s)  ??? ? ? ?Diabetes Maintenance-Serum Creatinine not due until??06/12/21??and every 1??year(s)  ???Satisfied?(in the past 1 year)  ??? ??Satisfied?  ??? ? ? ?ADL Screening on??07/06/20.??Satisfied by Rosa Hallman LPN.  ??? ? ? ?Alcohol Misuse Screening on??01/10/20.??Satisfied by Rebecca Maguire  ??? ? ? ?Asthma Management-Asthma Medication Prescribed on??12/02/19.??Satisfied by Tomasz Watts MD  ??? ? ? ?Blood Pressure Screening on??07/06/20.??Satisfied by Rosa Hallman LPN.  ??? ? ? ?Body Mass Index Check on??07/06/20.??Satisfied by Rosa Hallman LPN.  ??? ? ? ?Breast Cancer Screening on??07/11/19.??Satisfied by Poonam Rincon  ??? ? ? ?Depression Screening on??06/23/20.??Satisfied by Kelton Martinez LPN  ??? ? ? ?Diabetes Maintenance-Serum Creatinine on??06/12/20.??Satisfied by Veronica Kent  ??? ? ? ?Diabetes Screening on??06/12/20.??Satisfied by Veronica Kent  ??? ? ? ?Hypertension Management-Blood Pressure on??07/06/20.??Satisfied by Rosa Hallman LPN  ??? ? ? ?Influenza Vaccine on??10/18/19.??Satisfied by Lilliam Prakash LPN.  ??? ? ? ?Lipid Screening on??06/12/20.??Satisfied by Veronica Kent  ??? ? ? ?Obesity Screening on??07/06/20.??Satisfied by Rosa Hallman LPN  ?      ????I discussed with the resident and agree with the residents findings and plan as documented in the residents note.

## 2022-05-02 NOTE — HISTORICAL [facility] CERNER
This is a historical note converted from [employer]. Formatting and pictures may have been removed.  Please reference [employer] for original formatting and attached multimedia. Chief Complaint  wic, c/o pain right knee.  History of Present Illness  52 yo F with right knee pain of acute onset. Does not recall any trauma or injury. Reports she has also noticed some subjective swelling. She has attempted to wear a knee brace and take Ibuprofen with no relief. She denies any changes in her gait, denies fevers, chills, discolorations, warmth or redness of the knee joint. Her gait is not affected, but she experiences some pain when walking.  Review of Systems  Constitutional: no fever, no chills, no weight loss  CV: no swelling, no edema, no chest pain, no palpitations  RESP: no SOB, no wheezing, no difficulty breathing  Skin: no rash, no wound, no discolorations  Neuro: no numbness/tingling, no weakness, no saddle anesthesia, no syncope  MSK: + right knee pain, + right knee swelling, no limb deformities, no gait disturbances  Psych: no depression, no anxiety  Physical Exam  Vitals & Measurements  T:?36.7? ?C (Oral)? HR:?61(Peripheral)? RR:?20? BP:?97/63? SpO2:?97%? WT:?161.7?kg? WT:?161.7?kg?  General: NAD; A&O x 3; responds appropriately to commands  PSYCH: alert and oriented with?appropriate mood and affect  SKIN: inspection and palpation of skin and soft tissue normal; no rashes or erythema appreciated  RESP: lungs CTA b/l, no wheezing, non-labored respirations  CV: +2 symmetrical pulses (radial), no edema of LEs b/l, no murmurs, rubs or gallops upon auscultation  NEURO: sensation intact by light touch of the lower and upper extremities b/l; no focal deficits  MSK: Right Knee: normal upon inspection, mild TTP of the medial patellar boarder; no swelling/discolorations/erythema; normal ROM; Ant drawer NEG. Left knee: normal, no bruising, swelling or discolorations, normal ROM  Assessment/Plan  1.?Right knee  pain?M25.561  -likely MSK strain; however given pts body habitus cannot r/o OA  -will order XR of right knee; will call with results  -Mobic 7.5mg bid for pain (7 days)  -advised weight loss to patient  -ED precautions given  -Continue use of right knee brace  -rest, ice, elevation as needed  -follow up with [facility]  Ordered:  meloxicam, 7.5 mg = 1 tab(s), Oral, BID, # 14 tab(s), 0 Refill(s), Pharmacy: [id number]  Clinic Follow-up PRN, 12/21/18 10:12:00 CST  XR Knee Right 1 or 2 Views, Routine, 12/21/18 10:12:00 CST, Pain, None, Ambulatory, Rad Type, Order for future visit, Right knee pain, Not Scheduled, 12/21/18 10:12:00 CST  ?  Keep scheduled f/u appt with PCP   Problem List/Past Medical History  Ongoing  Acute UTI  Allergic rhinitis(  Confirmed  )  Asthma  Calcaneal spur(  Confirmed  )  Depression  Flu vaccine need  HTN (hypertension)(  Confirmed  )  Leg edema  Morbid obesity  Pain in limb(  Confirmed  )  Spasm of muscle(  Confirmed  )  Sprain of foot(  Confirmed  )  Historical  Essential hypertension  Medications  Inpatient  No active inpatient medications  Home  albuterol 2.5 mg/3 mL (0.083%) inhalation solution, 2.5 mg= 3 mL, INH, q6hr, PRN, 2 refills  albuterol CFC free 90 mcg/inh inhalation aerosol with adapter, 2 puff(s), INH, q6hr, PRN, 2 refills  AMLODIPINE BESYLATE 10 MG TAB, 10 mg= 1 tab(s), Oral, Daily  AMOX-CLAV 875-125 MG TABLET, 1 tab(s), Oral, BID,? ?Not Taking, Completed Rx  Arnuity Ellipta 100 mcg inhalation powder, See Instructions, 4 refills  atorvastatin 20 mg oral tablet, See Instructions, 2 refills  BUPROPION HCL  MG TABLET, 150 mg= 1 tab(s), Oral, BID,? ?Not taking  Diamox Sequels 500 mg oral capsule, extended release, 500 mg= 1 cap(s), Oral, BID, 11 refills  FLUOXETINE HCL 20 MG CAPSULE, 20 mg= 1 cap(s), Oral, Daily  HYDROCHLOROTHIAZIDE 12.5 MG CP, 12.5 mg= 1 cap(s), Oral, Daily  hydrochlorothiazide 25 mg oral tablet, 25 mg= 1 tab(s), Oral, Daily, 2  refills  losartan 100 mg oral tablet, See Instructions, 2 refills  meloxicam 7.5 mg oral tablet, 7.5 mg= 1 tab(s), Oral, BID  metformin 500 mg oral tablet, See Instructions, 2 refills  Nebulizer device, See Instructions  Norvasc 5 mg oral tablet, 5 mg= 1 tab(s), Oral, Daily  Allergies  No Known Medication Allergies  Social History  Alcohol - Low Risk, 04/26/2015  Current, Wine, 1-2 times per year, 10/02/2018  Employment/School  Employed, 12/21/2018  Exercise  Exercise duration: 0., 12/21/2018  Home/Environment  Lives with Spouse, grandchildren. Alcohol abuse in household: No. Substance abuse in household: No. Smoker in household: No. Injuries/Abuse/Neglect in household: No. Feels unsafe at home: No. Safe place to go: No. Agency(s)/Others notified: No. TV/Computer concerns: No., 12/21/2018  Nutrition/Health  Regular, 12/21/2018  Sexual  Sexually active: Yes. Sexually active at age 12 Years. Number of current partners 1. Number of lifetime partners 1. Sexual orientation: Heterosexual. Uses condoms: No. History of sexual abuse: No., 12/21/2018  Substance Abuse - Denies Substance Abuse, 04/26/2015  Never, 02/15/2017  Tobacco - Denies Tobacco Use, 07/01/2015  Former smoker, N/A, 12/21/2018  Family History  CAD (coronary artery disease): Mother and Father.  Diabetes mellitus type 1.: Sister.  Hodgkins disease: Sister.  Renal failure syndrome.: Mother.  Stroke: Mother and Sister.  Tobacco abuse: Mother and Father.  Immunizations  Vaccine Date Status   influenza virus vaccine, inactivated 11/02/2018 Given   influenza virus vaccine, inactivated 03/08/2018 Given   influenza virus vaccine, inactivated 10/23/2014 Recorded       XR results reviewed. Spoke with PCP, Dr. Watts regarding patient, imaging and PE. He will f/u on pts next appt.   I reviewed the patients medical history, residents findings on physical exam, the diagnosis and the treatment plan. Care was reasonable and necessary.  ?  Consider decreasing BP meds

## 2022-05-02 NOTE — HISTORICAL [facility] CERNER
This is a historical note converted from [employer]. Formatting and pictures may have been removed.  Please reference [employer] for original formatting and attached multimedia. Chief Complaint  RT FOOT PAIN X1WK ? (PT STATES WALKED ON PIECE OF GLASS AND BROKE OFF IN FOOT)  History of Present Illness  55-year-old female states she stepped on a broken piece of glass 1 week ago?while at home,?states she dug a piece of glass out of the bottom of her foot,?still having some discomfort, no fever or chills.  Review of Systems  Constitutional: negative except as stated in HPI  Eye: negative except as stated in HPI  ENT: negative except as stated in HPI  Respiratory: negative except as stated in HPI  Cardiovascular: negative except as stated in HPI  Gastrointestinal: negative except as stated in HPI  Genitourinary: negative except as stated in HPI  Physical Exam  Vitals & Measurements  T:?36.7? ?C (Oral)? HR:?74(Peripheral)? RR:?22? BP:?139/82? SpO2:?98%?  HT:?163.00?cm? WT:?154.000?kg? BMI:?57.96?  VITAL SIGNS: ?Reviewed. ? ?  GENERAL:?In no apparent distress  HEAD: ?No signs of head trauma ?  MUSCULOSKELETAL:  Right?foot-small?defect medial plantar foot, mild tenderness, no bleeding.? No obvious foreign body.  ?  NEUROLOGIC EXAM:?Alert and oriented x 3. ?No focal sensory or strength deficits. ? Speech normal. ?Follows commands  ?  XR right foot-radiology interpretation is pending.? Reviewed x-rays together, there could be a small?faint foreign body in the plantar aspect  ?  Assessment/Plan  1.?Foreign body in right foot?S90.851A  Ordered:  XR Foot Right Minimum 3 Views, Stat, 04/05/21 16:00:00 CDT, Stepped on piece of glass 1 week ago, possible foreign body?, None, Ambulatory, Rad Type, Foreign body in right foot, Not Scheduled, 04/05/21 16:00:00 CDT  ?  Orders:  sulfamethoxazole-trimethoprim, 1 tab(s), Oral, BID, X 10 day(s), # 20 tab(s), 0 Refill(s), Pharmacy: [facility]/pharm[facility] #[id number], 163, cm, Height/Length Dosing, 04/05/21 15:33:00  CDT, 154, kg, Weight Dosing, 04/05/21 15:33:00 CDT  Reviewed x-rays together, discussed options.? Patient is having?only minimal symptoms currently.? Instead of intervening today, together we decided to give a short course of?antibiotics,?return to clinic?on Friday of this?worsens?or still bothering her and will explore at that?point.? Will follow up?official radiology interpretation.   Problem List/Past Medical History  Ongoing  Allergic rhinitis(  Confirmed  )  Asthma  Calcaneal spur(  Confirmed  )  Carpal tunnel syndrome, bilateral  Controlled diabetes mellitus  Depression  HTN (hypertension)(  Confirmed  )  Intracranial hypertension  Leg edema  Morbid obesity  Osteoarthritis of left hip  Spasm of muscle(  Confirmed  )  Well controlled diabetes mellitus  Historical  Essential hypertension  Procedure/Surgical History  Colorectal cancer screening; flexible sigmoidoscopy (11/09/2017)  Inspection of Lower Intestinal Tract, Via Natural or Artificial Opening Endoscopic (11/09/2017)  Sigmoidoscopy (11/09/2017)  Drainage of Spinal Canal, Percutaneous Approach, Diagnostic (09/05/2017)  Spinal puncture, lumbar, diagnostic (09/05/2017)  Drainage of Spinal Canal, Percutaneous Approach, Diagnostic (08/09/2017)  Spinal puncture, lumbar, diagnostic (08/09/2017)  GALL BLADDER  T&A  [facility] - Total abdominal hysterectomy  TUB. LIGATION.   Medications  acetaZOLAMIDE 500 mg oral capsule, extended release, See Instructions  albuterol 0.083% inhalation solution, 2.5 mg= 3 mL, NEB, q4hr, PRN  albuterol CFC free 90 mcg/inh inhalation aerosol with adapter, See Instructions, 4 refills  amlodipine 5 mg oral tablet, See Instructions  Arnuity Ellipta 100 mcg inhalation powder, See Instructions  atorvastatin 20 mg oral tablet, 20 mg= 1 tab(s), Oral, Daily, 3 refills  Bactrim  mg-160 mg oral tablet, 1 tab(s), Oral, BID  diclofenac 1% topical gel, TOP, QID  diclofenac sodium 75 mg oral delayed release tablet, 75 mg= 1 tab(s), Oral,  BID  Glucometer lancets, See Instructions, 3 refills  Glucometer Test Strips, See Instructions, 3 refills  hydroCHLOROthiazide 12.5 mg oral capsule, See Instructions  metformin 500 mg oral tablet, See Instructions  mupirocin 2% topical ointment, 1 maxx, Both Nostrils, TID  Nebulizer device, See Instructions  polyethylene glycol 3350 oral powder for reconstitution, 17 gm, Oral, Daily, 1 refills  potassium chloride 20 mEq oral TABLET extended release, 20 mEq= 1 tab(s), Oral, Daily  True Metrix Glucometer, See Instructions  valsartan 160 mg oral tablet, See Instructions  Allergies  No Known Allergies  No Known Medication Allergies  Social History  Abuse/Neglect  No, No, Yes, 04/05/2021  Alcohol - Low Risk, 04/26/2015  Never, 10/18/2019  Employment/School  Employed, 12/21/2018  Exercise  Exercise duration: 0., 12/21/2018  Home/Environment  Lives with Spouse, grandchildren. Alcohol abuse in household: No. Substance abuse in household: No. Smoker in household: No. Injuries/Abuse/Neglect in household: No. Feels unsafe at home: No. Safe place to go: No. Agency(s)/Others notified: No. TV/Computer concerns: No., 12/21/2018  Nutrition/Health  Regular, 12/21/2018  Sexual  Gender Identity Identifies as female., 10/22/2019  Spiritual/Cultural  Advent, 10/18/2019  Substance Use - Denies Substance Abuse, 04/26/2015  Never, Household substance abuse concerns: No., 07/06/2020  Tobacco - Denies Tobacco Use, 07/01/2015  Former smoker, quit more than 30 days ago, N/A, 04/05/2021  Family History  CAD (coronary artery disease): Mother and Father.  Diabetes mellitus type 1.: Sister.  Hodgkins disease: Sister.  Renal failure syndrome.: Mother.  Stroke: Mother and Sister.  Tobacco abuse: Mother and Father.  Immunizations  Vaccine Date Status   influenza virus vaccine, inactivated 10/29/2020 Given   influenza virus vaccine, inactivated 10/18/2019 Given   tetanus-diphtheria toxoids 06/20/2019 Given   influenza virus vaccine, inactivated  11/02/2018 Given   influenza virus vaccine, inactivated 03/08/2018 Given   influenza virus vaccine, inactivated 10/23/2014 Recorded   Health Maintenance  Health Maintenance  ???Pending?(in the next year)  ??? ??OverDue  ??? ? ? ?Hypertension Management-Education due??11/11/17??and every 1??year(s)  ??? ? ? ?Asthma Management-Spirometry due??03/09/19??and every 1??year(s)  ??? ??Due?  ??? ? ? ?Aspirin Therapy for CVD Prevention due??04/05/21??and every 1??year(s)  ??? ? ? ?Asthma Management-Asthma Education due??04/05/21??and every 6??month(s)  ??? ? ? ?Asthma Management-Jar[place] Flow due??04/05/21??Variable frequency  ??? ? ? ?Asthma Management-Written Action Plan due??04/05/21??and every 6??month(s)  ??? ? ? ?Zoster Vaccine due??04/05/21??Unknown Frequency  ??? ??Due In Future?  ??? ? ? ?Diabetes Maintenance-Foot Exam not due until??06/12/21??and every 1??year(s)  ??? ? ? ?ADL Screening not due until??07/06/21??and every 1??year(s)  ??? ? ? ?Breast Cancer Screening not due until??07/10/21??and every 2??year(s)  ??? ? ? ?Influenza Vaccine not due until??10/01/21??and every 1??day(s)  ??? ? ? ?Diabetes Maintenance-Eye Exam not due until??10/15/21??and every 1??year(s)  ??? ? ? ?Asthma Management-Asthma Medication Prescribed not due until??12/23/21??and every 1??year(s)  ??? ? ? ?Obesity Screening not due until??01/01/22??and every 1??year(s)  ??? ? ? ?Alcohol Misuse Screening not due until??01/02/22??and every 1??year(s)  ??? ? ? ?Depression Screening not due until??01/26/22??and every 1??year(s)  ??? ? ? ?Diabetes Maintenance-HgbA1c not due until??02/08/22??and every 1??year(s)  ??? ? ? ?Hypertension Management-BMP not due until??02/08/22??and every 1??year(s)  ??? ? ? ?Diabetes Maintenance-Serum Creatinine not due until??02/08/22??and every 1??year(s)  ??? ? ? ?Diabetes Maintenance-Fasting Lipid Profile not due until??02/08/22??and every 1??year(s)  ???Satisfied?(in the past 1 year)  ??? ??Satisfied?  ??? ? ?  ?ADL Screening on??07/06/20.??Satisfied by Rosa Hallman LPN  ??? ? ? ?Alcohol Misuse Screening on??03/08/21.??Satisfied by Felipe Heart LPN.  ??? ? ? ?Asthma Management-Asthma Medication Prescribed on??03/08/21.??Satisfied by Irma Simmons MD  ??? ? ? ?Blood Pressure Screening on??04/05/21.??Satisfied by Tiara Downey LPN.  ??? ? ? ?Body Mass Index Check on??04/05/21.??Satisfied by Tiara Downey LPN.  ??? ? ? ?Depression Screening on??01/26/21.??Satisfied by Dejuan Pablo LPN  ??? ? ? ?Diabetes Maintenance-Serum Creatinine on??02/08/21.??Satisfied by Hoda Manzano  ??? ? ? ?Diabetes Screening on??02/08/21.??Satisfied by Hoda Manzano  ??? ? ? ?Hypertension Management-Blood Pressure on??04/05/21.??Satisfied by Tiara Downey LPN.  ??? ? ? ?Influenza Vaccine on??10/29/20.??Satisfied by Yaniv Bustamante  ??? ? ? ?Lipid Screening on??02/08/21.??Satisfied by Hoda Manzano  ??? ? ? ?Obesity Screening on??04/05/21.??Satisfied by Tiara Downey LPN.  ?

## 2022-05-02 NOTE — HISTORICAL [facility] CERNER
This is a historical note converted from [place]. Formatting and pictures may have been removed.  Please reference [place] for original formatting and attached multimedia. Chief Complaint  Follow up carpal tunnel right wrist. Patient states pain in wrist has improved. c/o bilateral knee pain  History of Present Illness  Rosalee Prince?is a?54 Years?old?Female?who presents for f/u CTS.  PMHx: HTN, OA left hip, [place], CTS  ?  DMII  Needs Lipid, urine, Foot exam, and A1c today  complaint with metformin  denies dysuria, polyuria, or polydipsia  Fasting -120.  no complaints.  ?  CTS  Improved greatly with night splints.  using Tylenol PRN and night splints.  no complaints.  ?  Intracranial HTN  stable.  Had EEG this week but no result yet in EMR.  Has not had MRI brain yet.  Denies dizziness, unbalanced,?eye pain or vision changes.  ?  Asthma  stable  using Arnuity  using rescue inhaler 1-2x/wk.  No exacerbations. No nighttime awakenings.  ?  b/l knee OA  c/o continued knee pain despite supportive care.  Using Voltaren gel but not helping.  Review of Systems  General: Denies?fever and?chills  Respiratory:?denies cough or SOB  Cardio: Denies palpitations, tachycardia or chest pain  GI: Denies N/V/D, hematemesis, hematochezia, melena, or acid reflux  Skin: Denies rash  Physical Exam  Vitals & Measurements  T:?36.7? ?C (Oral)? HR:?58(Peripheral)? RR:?18? BP:?128/78? SpO2:?97%?  HT:?162?cm? WT:?159.8?kg? BMI:?60.89?  General: well-appearing, sitting comfortably in chair in no acute distress  HENT: PERRLA, EOMI  Resp: Lungs clear bilaterally. No wheezes or rhonchi.  Heart: RRR, no murmurs. No JVD.  GI:?soft, non-tender. Bowel sounds?active?X 4 quadrants.  Assessment/Plan  1.?Carpal tunnel syndrome, bilateral?G56.03  -Much improved  -Continue night splints  -Tylenol PRN  -Possible EMG with ortho referral if recurrs  Ordered:  Clinic Follow up, *Est. 06/26/20 3:00:00 CDT, 1-2 weeks, ok to overbook, Order for future  visit, Controlled diabetes mellitus  Carpal tunnel syndrome, bilateral  Intracranial hypertension  Encounter for screening mammogram for breast cancer, [facility]  ?  2.?Controlled diabetes mellitus?E11.9,?  -A1c, BMP,?Lipids, urine dip, urine Cr/Prot ordered  -foot exam in EMR  -Continue metformin  -Educated patient on proper diabetic diet. Avoid simple sugars including white breads, pasta, rice. Avoid fried, fatty foods.?Incorporate grilled protein and fibrous vegetables into diet.  Controlled diabetes mellitus?E11.9,?Controlled diabetes mellitus?E11.9,?Controlled diabetes mellitus?E11.9  -See above  Ordered:  Basic Metabolic Panel, Routine collect, 06/12/20 9:16:00 CDT, Blood, Stop date 06/12/20 9:16:00 CDT, Lab Collect, Venous Draw, Controlled diabetes mellitus, 06/13/20 5:00:00 CDT  Clinic Follow up, *Est. 06/26/20 3:00:00 CDT, 1-2 weeks, ok to overbook, Order for future visit, Controlled diabetes mellitus  Carpal tunnel syndrome, bilateral  Intracranial hypertension  Encounter for screening mammogram for breast cancer, [facility]  Hemoglobin A1C [facility], Routine collect, 06/12/20 9:16:00 CDT, Blood, Stop date 06/12/20 9:16:00 CDT, Lab Collect, Venous Draw, Controlled diabetes mellitus, 06/12/20 9:01:00 CDT  Lipid Panel, Routine collect, 06/12/20 9:16:00 CDT, Blood, Stop date 06/12/20 9:16:00 CDT, Lab Collect, Venous Draw, Controlled diabetes mellitus, 06/13/20 5:00:00 CDT  Protein/Creatinine Ratio Urine, Routine collect, Urine, 06/12/20 9:11:00 CDT, Stop date 06/12/20 9:11:00 CDT, Nurse collect, Controlled diabetes mellitus, 06/12/20 9:11:00 CDT  ?  3.?Intracranial hypertension?G93.2  -f/u EEG, MRI  -ED precautions  Ordered:  Clinic Follow up, *Est. 06/26/20 3:00:00 CDT, 1-2 weeks, ok to overbook, Order for future visit, Controlled diabetes mellitus  Carpal tunnel syndrome, bilateral  Intracranial hypertension  Encounter for screening mammogram for breast cancer, [facility]  MRI Brain W W/O Contrast, Routine, *Est. 06/12/20 3:00:00 CDT, Other (please specify), Benign intracranial hypertension, None, Stretcher, Creatinine if needed per protocol, Rad Type, Order for future visit, Intracranial hypertension, Schedule this test, [facility] and...  ?  4.?Bilateral primary osteoarthritis of knee?M17.0  -Continue Voltaren, Tylenol.  -Exercises, Rest, Ice, elevation.  -RTC 2 weeks for knee injection  ?  5.?Asthma?J45.909  -Stable  -no exacerbations  ?  6.?Encounter for screening mammogram for breast cancer?Z12.31  -mammo ordered  Ordered:  Clinic Follow up, *Est. 06/26/20 3:00:00 CDT, 1-2 weeks, ok to overbook, Order for future visit, Controlled diabetes mellitus  Carpal tunnel syndrome, bilateral  Intracranial hypertension  Encounter for screening mammogram for breast cancer, [facility]  MG Screening Bilateral, Routine, *Est. 06/12/20 3:00:00 CDT, Screening, None, Ambulatory, Rad Type, Order for future visit, Encounter for screening mammogram for breast cancer, Schedule this test, [facility], Follow Breast Imaging Order Set Protocol, *Es...  ?  Orders:  Urine Dipstick POC, 06/12/20 9:02:00 CDT, Stop date 06/12/20 9:02:00 CDT, 06/12/20 9:02:00 CDT  ?  RTC 1-2 weeks for injection  ?  Tomasz Watts MD - HOIII  [facility]  Referrals  Clinic Follow up, *Est. 06/26/20 3:00:00 CDT, 1-2 weeks, ok to overbook, Order for future visit, Controlled diabetes mellitus  Carpal tunnel syndrome, bilateral  Intracranial hypertension  Encounter for screening mammogram for breast cancer, [facility]   Problem List/Past Medical History  Ongoing  Allergic rhinitis(  Confirmed  )  Asthma  Calcaneal spur(  Confirmed  )  Carpal tunnel syndrome, bilateral  Controlled diabetes mellitus  Depression  HTN (hypertension)(  Confirmed  )  Intracranial hypertension  Leg edema  Morbid obesity  Osteoarthritis of  left hip  Spasm of muscle(  Confirmed  )  Well controlled diabetes mellitus  Historical  Essential hypertension  Procedure/Surgical History  Colorectal cancer screening; flexible sigmoidoscopy (11/09/2017)  Inspection of Lower Intestinal Tract, Via Natural or Artificial Opening Endoscopic (11/09/2017)  Sigmoidoscopy (11/09/2017)  Drainage of Spinal Canal, Percutaneous Approach, Diagnostic (09/05/2017)  Spinal puncture, lumbar, diagnostic (09/05/2017)  Drainage of Spinal Canal, Percutaneous Approach, Diagnostic (08/09/2017)  Spinal puncture, lumbar, diagnostic (08/09/2017)  GALL BLADDER  T&A  MILTON - Total abdominal hysterectomy  TUB. LIGATION.   Medications  albuterol CFC free 90 mcg/inh inhalation aerosol with adapter, See Instructions, 4 refills  Arnuity Ellipta 100 mcg inhalation powder, See Instructions, 4 refills  atorvastatin 20 mg oral tablet, 20 mg= 1 tab(s), Oral, Daily, 3 refills  Diamox Sequels 500 mg oral capsule, extended release, 500 mg= 1 cap(s), Oral, BID, 3 refills  Glucometer lancets, See Instructions, 3 refills  Glucometer Test Strips, See Instructions, 3 refills  hydroCHLOROthiazide 12.5 mg oral capsule, See Instructions  metformin 500 mg oral tablet, 500 mg= 1 tab(s), Oral, BID, 2 refills  Nebulizer device, See Instructions  Norvasc 5 mg oral tablet, 5 mg= 1 tab(s), Oral, Daily  polyethylene glycol 3350 oral powder for reconstitution, 17 gm, Oral, Daily, 1 refills  True Metrix Glucometer, See Instructions  valsartan 160 mg oral tablet, See Instructions, 2 refills  Voltaren 1% topical gel, 1 maxx, TOP, QID, PRN  Allergies  No Known Medication Allergies  Social History  Abuse/Neglect  No, No, Yes, 06/12/2020  No, 06/03/2020  No, No, Yes, 03/10/2020  Alcohol - Low Risk, 04/26/2015  Never, 10/18/2019  Employment/School  Employed, 12/21/2018  Exercise  Exercise duration: 0., 12/21/2018  Home/Environment  Lives with Spouse, grandchildren. Alcohol abuse in household: No. Substance abuse in household:  No. Smoker in household: No. Injuries/Abuse/Neglect in household: No. Feels unsafe at home: No. Safe place to go: No. Agency(s)/Others notified: No. TV/Computer concerns: No., 12/21/2018  Nutrition/Health  Regular, 12/21/2018  Sexual  Gender Identity Identifies as female., 10/22/2019  Spiritual/Cultural  Roman Catholic, 10/18/2019  Substance Use - Denies Substance Abuse, 04/26/2015  Never, 02/15/2017  Tobacco - Denies Tobacco Use, 07/01/2015  Never (less than 100 in lifetime), N/A, Household tobacco concerns: No. Smokeless Tobacco Use: Never., 06/12/2020  Never (less than 100 in lifetime), No, 03/10/2020  Family History  CAD (coronary artery disease): Mother and Father.  Diabetes mellitus type 1.: Sister.  Hodgkins disease: Sister.  Renal failure syndrome.: Mother.  Stroke: Mother and Sister.  Tobacco abuse: Mother and Father.  Immunizations  Vaccine Date Status   influenza virus vaccine, inactivated 10/18/2019 Given   tetanus-diphtheria toxoids 06/20/2019 Given   influenza virus vaccine, inactivated 11/02/2018 Given   influenza virus vaccine, inactivated 03/08/2018 Given   influenza virus vaccine, inactivated 10/23/2014 Recorded       I discussed and agree with the residents findings and plan as documented in the residents note.  ?   DM- Hgba1c- 7.6- increase metformin  ?  Idiopathic intracranial HTN since 2017- CT brain done 2017- nl, LP done 2017 with opening pressure of 33, EEG done 6/10/20- neg,?MRI brain ordered (may consider MR venography) and referred?to neurology, weight loss recommended and pt treated with Diamox  ?

## 2022-06-28 PROBLEM — G47.33 OSA ON CPAP: Status: ACTIVE | Noted: 2022-06-28

## 2022-06-28 PROBLEM — E66.01 MORBID OBESITY: Status: ACTIVE | Noted: 2022-06-28

## 2022-06-28 PROBLEM — G43.109 MIGRAINE WITH AURA AND WITHOUT STATUS MIGRAINOSUS, NOT INTRACTABLE: Status: ACTIVE | Noted: 2022-06-28

## 2022-09-21 ENCOUNTER — HISTORICAL (OUTPATIENT)
Dept: ADMINISTRATIVE | Facility: HOSPITAL | Age: 57
End: 2022-09-21
Payer: MEDICAID

## 2022-11-17 ENCOUNTER — OFFICE VISIT (OUTPATIENT)
Dept: FAMILY MEDICINE | Facility: CLINIC | Age: 57
End: 2022-11-17
Payer: MEDICAID

## 2022-11-17 VITALS
WEIGHT: 293 LBS | SYSTOLIC BLOOD PRESSURE: 179 MMHG | OXYGEN SATURATION: 98 % | TEMPERATURE: 99 F | HEART RATE: 88 BPM | BODY MASS INDEX: 50.02 KG/M2 | HEIGHT: 64 IN | RESPIRATION RATE: 18 BRPM | DIASTOLIC BLOOD PRESSURE: 95 MMHG

## 2022-11-17 DIAGNOSIS — Z23 IMMUNIZATION DUE: Primary | ICD-10-CM

## 2022-11-17 DIAGNOSIS — E11.9 TYPE 2 DIABETES MELLITUS WITHOUT COMPLICATION, WITHOUT LONG-TERM CURRENT USE OF INSULIN: ICD-10-CM

## 2022-11-17 DIAGNOSIS — M17.0 PRIMARY OSTEOARTHRITIS OF BOTH KNEES: ICD-10-CM

## 2022-11-17 DIAGNOSIS — Z20.2 EXPOSURE TO SEXUALLY TRANSMITTED DISEASE (STD): ICD-10-CM

## 2022-11-17 PROBLEM — M17.9 OA (OSTEOARTHRITIS) OF KNEE: Status: ACTIVE | Noted: 2022-11-17

## 2022-11-17 LAB
CHOLEST SERPL-MCNC: 199 MG/DL
CHOLEST/HDLC SERPL: 5 {RATIO} (ref 0–5)
EST. AVERAGE GLUCOSE BLD GHB EST-MCNC: 131.2 MG/DL
HBA1C MFR BLD: 6.2 %
HDLC SERPL-MCNC: 42 MG/DL (ref 35–60)
HIV 1+2 AB+HIV1 P24 AG SERPL QL IA: NONREACTIVE
LDLC SERPL CALC-MCNC: 137 MG/DL (ref 50–140)
T PALLIDUM AB SER QL: NONREACTIVE
TRIGL SERPL-MCNC: 98 MG/DL (ref 37–140)
VLDLC SERPL CALC-MCNC: 20 MG/DL

## 2022-11-17 PROCEDURE — 86780 TREPONEMA PALLIDUM: CPT

## 2022-11-17 PROCEDURE — 87389 HIV-1 AG W/HIV-1&-2 AB AG IA: CPT

## 2022-11-17 PROCEDURE — 99214 OFFICE O/P EST MOD 30 MIN: CPT | Mod: PBBFAC

## 2022-11-17 PROCEDURE — 36415 COLL VENOUS BLD VENIPUNCTURE: CPT

## 2022-11-17 PROCEDURE — 90471 IMMUNIZATION ADMIN: CPT | Mod: PBBFAC

## 2022-11-17 PROCEDURE — 83036 HEMOGLOBIN GLYCOSYLATED A1C: CPT

## 2022-11-17 PROCEDURE — 80061 LIPID PANEL: CPT

## 2022-11-17 RX ORDER — DICLOFENAC SODIUM 75 MG/1
75 TABLET, DELAYED RELEASE ORAL 2 TIMES DAILY WITH MEALS
Qty: 28 TABLET | Refills: 0 | Status: SHIPPED | OUTPATIENT
Start: 2022-11-17 | End: 2023-03-17

## 2022-11-17 NOTE — PROGRESS NOTES
[facility] Office Visit Note    CC:   Knee Pain       HPI:  Rosalee Prince is a 57 y.o. female who presents for knee pain     Has not been seen in clinic in over 1 year   Hx of bilateral knee OA, L>R   Pain has been worsening over the past few weeks   Has tried Mobic in the past with moderate relief   She has had CSI multiple times and also reports moderate relief   Never completed PT   Denies any acute injury, swelling, redness, f/c     She is also requesting syphilis testing   Reports her partner told her last week he tested positive for syphilis   Appears this was a remote hx of syphilis and he was treated in the past   She denies any symptoms, no lesions, vaginal discharge/bleeding, dysuria,skin rash, f/c   She refuses pelvic exam today     BP noted to be elevated on visit   Asymptomatic, denies any HA, vision changes, SOB, chest pain   Reports she did not take her morning medications since she was fasting for routine labs this AM   Not checking BP at home   Reports she is usually complaint with her medications     Review of Systems:   Review of Systems   Constitutional:  Negative for chills and fever.   Respiratory:  Negative for shortness of breath.    Cardiovascular:  Negative for chest pain, palpitations and leg swelling.      Current Outpatient Medications   Medication Instructions    acetaZOLAMIDE (DIAMOX) 500 mg CpSR 501 capsules, Oral, 2 times daily    amLODIPine (NORVASC) 5 MG tablet 1 tablet, Oral, Daily    atorvastatin (LIPITOR) 40 MG tablet 1 tablet, Oral, Daily    benzonatate (TESSALON) 200 MG capsule 1 capsule, Oral, 3 times daily    diclofenac (VOLTAREN) 75 mg, Oral, 2 times daily with meals    fluticasone furoate (ARNUITY ELLIPTA) 100 mcg/actuation inhaler 1 puff, Inhalation, Every 24 hours as needed    hydroCHLOROthiazide (MICROZIDE) 12.5 mg capsule 1 capsule, Oral, Daily    metFORMIN (GLUCOPHAGE) 500 MG tablet 1 tablet, Oral, 2 times daily    topiramate (TROKENDI XR) 50 mg Cp24 51  capsules, Oral, Daily    valsartan (DIOVAN) 160 MG tablet 1 tablet, Oral, Daily        Physical Exam:   Vitals:    11/17/22 1046   BP: (!) 179/95   Pulse: 88   Resp: 18   Temp: 98.8 °F (37.1 °C)      Physical Exam   Constitutional: She is oriented to person, place, and time.  Non-toxic appearance. No distress.   Eyes: Conjunctivae are normal.   Cardiovascular: Normal rate and regular rhythm. Exam reveals no gallop.   No murmur heard.  Pulmonary/Chest: Effort normal and breath sounds normal. No respiratory distress. She has no wheezes. She has no rales.   Abdominal: Soft. Bowel sounds are normal. She exhibits no distension. There is no abdominal tenderness.   Musculoskeletal:      Right knee: No swelling, effusion or erythema. Normal range of motion. No tenderness.      Left knee: No swelling, effusion or erythema. Normal range of motion. No tenderness.      Right lower leg: No edema.      Left lower leg: No edema.   Neurological: She is alert and oriented to person, place, and time.   Skin: Skin is warm and dry.      Assessment/Plan:  1. Immunization due    2. Type 2 diabetes mellitus without complication, without long-term current use of insulin    3. Primary osteoarthritis of both knees    4. Exposure to sexually transmitted disease (STD)    1. Type 2 diabetes mellitus without complication, without long-term current use of insulin  - Will draw routine labs, A1C and lipid panel   - Follow up w/ PCP   - If A1C well controlled can discuss repeat CSI at next visit     2. Primary osteoarthritis of both knees  - Continue conservative measures   - ROM Exercises   - Discussed weight loss   - Diclofenac 75mg BID x2 weeks for pain   - Counseled on risks of chronic NSAID use     3. Exposure to sexually transmitted disease (STD)  - RPR and HIV     4. Immunization due  - Flu shot     5. Hypertension   - Discussed importance of medication compliance   - She has refills on her medications   - Home BP log   - RTC 2 weeks for BP  check   - Return precautions     Return to clinic in 2 weeks for BP check     Desmond Panchal M.D. [facility]

## 2022-11-18 NOTE — PROGRESS NOTES
Faculty addendum: Patient discussed with resident. Chart was reviewed including vitals, labs, etc. Care provided reasonable and necessary. I participated in the management of the patient and was immediately available throughout the encounter. Services were furnished in a primary care center located in the outpatient department of a [facility] hospital. I agree with the resident's findings and plan as documented in the resident's note.

## 2022-12-19 ENCOUNTER — OFFICE VISIT (OUTPATIENT)
Dept: URGENT CARE | Facility: CLINIC | Age: 57
End: 2022-12-19
Payer: MEDICAID

## 2022-12-19 VITALS
WEIGHT: 293 LBS | OXYGEN SATURATION: 96 % | RESPIRATION RATE: 20 BRPM | SYSTOLIC BLOOD PRESSURE: 148 MMHG | TEMPERATURE: 100 F | BODY MASS INDEX: 50.02 KG/M2 | HEART RATE: 91 BPM | DIASTOLIC BLOOD PRESSURE: 78 MMHG | HEIGHT: 64 IN

## 2022-12-19 DIAGNOSIS — J01.90 ACUTE SINUSITIS, RECURRENCE NOT SPECIFIED, UNSPECIFIED LOCATION: Primary | ICD-10-CM

## 2022-12-19 DIAGNOSIS — R50.9 FEVER, UNSPECIFIED FEVER CAUSE: ICD-10-CM

## 2022-12-19 DIAGNOSIS — Z11.52 ENCOUNTER FOR SCREENING FOR SEVERE ACUTE RESPIRATORY SYNDROME CORONAVIRUS 2 (SARS-COV-2) INFECTION: ICD-10-CM

## 2022-12-19 DIAGNOSIS — R35.0 URINARY FREQUENCY: ICD-10-CM

## 2022-12-19 DIAGNOSIS — R05.9 COUGH, UNSPECIFIED TYPE: ICD-10-CM

## 2022-12-19 DIAGNOSIS — R52 BODY ACHES: ICD-10-CM

## 2022-12-19 LAB
CTP QC/QA: YES
CTP QC/QA: YES
FLUAV AG NPH QL: NEGATIVE
FLUBV AG NPH QL: NEGATIVE
SARS-COV-2 RDRP RESP QL NAA+PROBE: NEGATIVE

## 2022-12-19 PROCEDURE — 87635 SARS-COV-2 COVID-19 AMP PRB: CPT | Mod: PBBFAC | Performed by: NURSE PRACTITIONER

## 2022-12-19 PROCEDURE — 87804 INFLUENZA ASSAY W/OPTIC: CPT | Mod: 59,PBBFAC | Performed by: NURSE PRACTITIONER

## 2022-12-19 PROCEDURE — 99215 OFFICE O/P EST HI 40 MIN: CPT | Mod: PBBFAC | Performed by: NURSE PRACTITIONER

## 2022-12-19 PROCEDURE — 99214 PR OFFICE/OUTPT VISIT, EST, LEVL IV, 30-39 MIN: ICD-10-PCS | Mod: S$PBB,,, | Performed by: NURSE PRACTITIONER

## 2022-12-19 PROCEDURE — 99214 OFFICE O/P EST MOD 30 MIN: CPT | Mod: S$PBB,,, | Performed by: NURSE PRACTITIONER

## 2022-12-19 RX ORDER — LEVOCETIRIZINE DIHYDROCHLORIDE 5 MG/1
5 TABLET, FILM COATED ORAL NIGHTLY
Qty: 14 TABLET | Refills: 0 | Status: SHIPPED | OUTPATIENT
Start: 2022-12-19 | End: 2023-03-17

## 2022-12-19 RX ORDER — PROMETHAZINE HYDROCHLORIDE AND DEXTROMETHORPHAN HYDROBROMIDE 6.25; 15 MG/5ML; MG/5ML
5 SYRUP ORAL EVERY 6 HOURS PRN
Qty: 100 ML | Refills: 0 | Status: SHIPPED | OUTPATIENT
Start: 2022-12-19 | End: 2023-03-17

## 2022-12-19 RX ORDER — AMOXICILLIN 875 MG/1
875 TABLET, FILM COATED ORAL 2 TIMES DAILY
Qty: 20 TABLET | Refills: 0 | Status: SHIPPED | OUTPATIENT
Start: 2022-12-19 | End: 2022-12-29

## 2022-12-19 NOTE — PROGRESS NOTES
"Subjective:       Patient ID: Rosalee Prince is a 57 y.o. female.    Vitals:  height is 5' 4" (1.626 m) and weight is 177.8 kg (392 lb) (abnormal). Her oral temperature is 100.1 °F (37.8 °C). Her blood pressure is 148/78 (abnormal) and her pulse is 91. Her respiration is 20 and oxygen saturation is 96%.     Chief Complaint: Cough (Nasal congestion, fever, body aches, fever, urinary frequency. )    CC as above, states symptoms started yesterday.  Patient states she always struggles with sinus issues.  + flu exposure.  Also states she is been having urinary frequency, some incontinence that started this past week. States she just went to the bathroom and is unable to provide urine sample at this time. Taking delsym for symptoms.      Constitution: Positive for fever.   HENT:  Positive for congestion and sinus pain.    Neck: neck negative.   Cardiovascular: Negative.    Respiratory:  Positive for cough.    Gastrointestinal: Negative.    Genitourinary:  Positive for frequency and bladder incontinence.     Objective:      Physical Exam   Constitutional: She is oriented to person, place, and time. She appears well-developed.   HENT:   Head: Normocephalic.   Ears:   Right Ear: Tympanic membrane normal.   Left Ear: Tympanic membrane normal.   Nose: Congestion present. Right sinus exhibits frontal sinus tenderness. Left sinus exhibits frontal sinus tenderness.   Mouth/Throat: Oropharynx is clear.   Eyes: Conjunctivae and EOM are normal. Pupils are equal, round, and reactive to light.   Neck: Neck supple.   Cardiovascular: Normal rate, regular rhythm and normal heart sounds.   Pulmonary/Chest: Effort normal and breath sounds normal.   Abdominal: Bowel sounds are normal. Soft. There is no left CVA tenderness and no right CVA tenderness.   Musculoskeletal: Normal range of motion.         General: Normal range of motion.   Neurological: She is alert and oriented to person, place, and time.   Skin: Skin is warm and dry. "   Psychiatric: Her behavior is normal.   Vitals reviewed.      Assessment:       1. Acute sinusitis, recurrence not specified, unspecified location    2. Encounter for screening for severe acute respiratory syndrome coronavirus 2 (SARS-CoV-2) infection    3. Cough, unspecified type    4. Body aches    5. Fever, unspecified fever cause    6. Urinary frequency              Office Visit on 12/19/2022   Component Date Value Ref Range Status    POC Rapid COVID 12/19/2022 Negative  Negative Final     Acceptable 12/19/2022 Yes   Final    Rapid Influenza A Ag 12/19/2022 Negative  Negative Final    Rapid Influenza B Ag 12/19/2022 Negative  Negative Final     Acceptable 12/19/2022 Yes   Final        No results found.   Plan:           Medication as ordered. May use humidifier.  If any shortness of breath, wheezing, continued fevers or any new symptoms then immediately go to ER.      Pt unable to provide urine sample in office, pt instructed and given supplies to home collect and return to lab for UA with reflex to c&s.     Acute sinusitis, recurrence not specified, unspecified location  -     amoxicillin (AMOXIL) 875 MG tablet; Take 1 tablet (875 mg total) by mouth 2 (two) times daily. for 10 days  Dispense: 20 tablet; Refill: 0    Encounter for screening for severe acute respiratory syndrome coronavirus 2 (SARS-CoV-2) infection  -     POCT COVID-19 Rapid Screening    Cough, unspecified type  -     POCT COVID-19 Rapid Screening  -     POCT Influenza A/B  -     promethazine-dextromethorphan (PROMETHAZINE-DM) 6.25-15 mg/5 mL Syrp; Take 5 mLs by mouth every 6 (six) hours as needed (cough).  Dispense: 100 mL; Refill: 0  -     levocetirizine (XYZAL) 5 MG tablet; Take 1 tablet (5 mg total) by mouth every evening. for 14 days  Dispense: 14 tablet; Refill: 0    Body aches  -     POCT COVID-19 Rapid Screening  -     POCT Influenza A/B    Fever, unspecified fever cause  -     POCT COVID-19 Rapid  Screening  -     POCT Influenza A/B    Urinary frequency  -     Urinalysis, Reflex to Urine Culture Urine, Clean Catch; Future; Expected date: 12/19/2022

## 2023-01-12 ENCOUNTER — OFFICE VISIT (OUTPATIENT)
Dept: FAMILY MEDICINE | Facility: CLINIC | Age: 58
End: 2023-01-12
Payer: MEDICAID

## 2023-01-12 VITALS
OXYGEN SATURATION: 100 % | SYSTOLIC BLOOD PRESSURE: 140 MMHG | BODY MASS INDEX: 67.29 KG/M2 | RESPIRATION RATE: 18 BRPM | WEIGHT: 293 LBS | HEART RATE: 102 BPM | DIASTOLIC BLOOD PRESSURE: 84 MMHG

## 2023-01-12 DIAGNOSIS — I10 HYPERTENSION, UNSPECIFIED TYPE: Primary | ICD-10-CM

## 2023-01-12 DIAGNOSIS — M17.0 PRIMARY OSTEOARTHRITIS OF BOTH KNEES: ICD-10-CM

## 2023-01-12 PROBLEM — J45.909 ASTHMA: Status: ACTIVE | Noted: 2023-01-12

## 2023-01-12 PROBLEM — G93.2 ELEVATED INTRACRANIAL PRESSURE: Status: ACTIVE | Noted: 2023-01-12

## 2023-01-12 PROBLEM — M77.30 CALCANEAL SPUR: Status: ACTIVE | Noted: 2023-01-12

## 2023-01-12 PROBLEM — G56.00 CARPAL TUNNEL SYNDROME: Status: ACTIVE | Noted: 2023-01-12

## 2023-01-12 PROBLEM — M16.12 OSTEOARTHRITIS OF LEFT HIP: Status: ACTIVE | Noted: 2023-01-12

## 2023-01-12 PROBLEM — F32.A DEPRESSIVE DISORDER: Status: ACTIVE | Noted: 2023-01-12

## 2023-01-12 PROBLEM — E78.5 HYPERLIPIDEMIA: Status: ACTIVE | Noted: 2023-01-12

## 2023-01-12 PROCEDURE — 99213 OFFICE O/P EST LOW 20 MIN: CPT | Mod: PBBFAC

## 2023-01-12 RX ORDER — AMLODIPINE BESYLATE 5 MG/1
10 TABLET ORAL DAILY
Qty: 30 TABLET | Refills: 3 | Status: SHIPPED | OUTPATIENT
Start: 2023-01-12 | End: 2023-03-17 | Stop reason: SDUPTHER

## 2023-01-12 NOTE — PROGRESS NOTES
[facility] OFFICE VISIT NOTE  Rosalee Prince  [id number]  01/12/2023      Chief Complaint: Knee Pain (Bilateral )      HPI    Rosalee Prince is a 57 y.o. female  presenting to [facility] for BP check.     HTN  -states took all BP meds 30 min prior to arrival   -compliant with Amlodipine 5mg qd, HCTZ 12.5mg qd, valsartan 160 mg qd  -BP at home: 140's/80's  -denies chest pain, sob, headaches     OA of bilateral knees  -XR L and R knee: 1/26/21: Degenerative changes  -Has tried Mobic in the past with moderate relief   -She has had CSI multiple times and also reports moderate relief   -Never completed PT  -currently taking Diclofenac 75mg BID x2 weeks for pain     ROS:  CONSTITUTIONAL: No weight loss, fever, chills, or weakness.    CARDIOVASCULAR: No chest pain, chest pressure, or chest discomfort. No palpitations.    RESPIRATORY: No shortness of breath, cough, or sputum.    GASTROINTESTINAL: No nausea, vomiting or diarrhea. No abdominal pain,    Vitals:    01/12/23 0942   BP: (!) 140/84   Pulse: 102   Resp: 18       PE:  General: appears well, in no acute distress   Eye: no scleral icterus   HENT: MMM  Respiratory: clear to auscultation bilaterally, nonlabored respirations   Cardiovascular: regular rate and rhythm without murmurs or gallops, no edema in bilateral lower extremities       Current Medications:   Current Outpatient Medications   Medication Sig Dispense Refill    acetaZOLAMIDE (DIAMOX) 500 mg CpSR Take 501 capsules by mouth 2 (two) times daily.      amLODIPine (NORVASC) 5 MG tablet Take 2 tablets (10 mg total) by mouth once daily. 30 tablet 3    atorvastatin (LIPITOR) 40 MG tablet Take 1 tablet by mouth once daily.      benzonatate (TESSALON) 200 MG capsule Take 1 capsule by mouth 3 (three) times daily.      diclofenac (VOLTAREN) 75 MG EC tablet Take 1 tablet (75 mg total) by mouth 2 (two) times daily with meals. for 14 days 28 tablet 0    fluticasone furoate (ARNUITY ELLIPTA) 100 mcg/actuation inhaler  Inhale 1 puff into the lungs every 24 hours as needed.      hydroCHLOROthiazide (MICROZIDE) 12.5 mg capsule Take 1 capsule by mouth once daily.      levocetirizine (XYZAL) 5 MG tablet Take 1 tablet (5 mg total) by mouth every evening. for 14 days 14 tablet 0    metFORMIN (GLUCOPHAGE) 500 MG tablet Take 1 tablet by mouth 2 (two) times daily.      promethazine-dextromethorphan (PROMETHAZINE-DM) 6.25-15 mg/5 mL Syrp Take 5 mLs by mouth every 6 (six) hours as needed (cough). 100 mL 0    topiramate (TROKENDI XR) 50 mg Cp24 Take 51 capsules by mouth once daily.      valsartan (DIOVAN) 160 MG tablet Take 1 tablet by mouth once daily.       No current facility-administered medications for this visit.       Assessment:   1. Hypertension, unspecified type    2. Primary osteoarthritis of both knees        Plan:    HTN  -Increase Amlodipine to 10 mg qd  -Continue HCTZ 12.5 mg qd and Valsartan 160 mg qd  -Low Sodium Diet (DASH Diet - Less than 2 grams of sodium per day).  -Monitor blood pressure daily and log. Report consistent numbers greater than 140/90.  -Smoking cessation encouraged to aid in BP reduction.  -Maintain healthy weight with goal BMI <30. Exercise 30 minutes per day, 5 days per week.  -CMP and CBC ordered today     Bilateral Knee OA  -schedule for CSI injections in 2 weeks      Return to clinic in 3 weeks for BP check and bilateral knee injections, or sooner if needed.     Irma Laboy M.D.  [facility]

## 2023-01-20 NOTE — PROGRESS NOTES
Faculty addendum: Patient discussed with resident. Chart was reviewed including vitals, labs, etc. Care provided reasonable and necessary. I participated in the management of the patient and was immediately available throughout the encounter. Services were furnished in a primary care center located in the outpatient department of a [facility] hospital. I agree with the resident's findings and plan as documented in the resident's note.

## 2023-03-16 ENCOUNTER — APPOINTMENT (OUTPATIENT)
Dept: LAB | Facility: HOSPITAL | Age: 58
End: 2023-03-16
Attending: STUDENT IN AN ORGANIZED HEALTH CARE EDUCATION/TRAINING PROGRAM
Payer: MEDICAID

## 2023-03-16 LAB
ALBUMIN SERPL-MCNC: 3.8 G/DL (ref 3.5–5)
ALBUMIN/GLOB SERPL: 1 RATIO (ref 1.1–2)
ALP SERPL-CCNC: 92 UNIT/L (ref 40–150)
ALT SERPL-CCNC: 20 UNIT/L (ref 0–55)
AST SERPL-CCNC: 17 UNIT/L (ref 5–34)
BASOPHILS # BLD AUTO: 0.06 X10(3)/MCL (ref 0–0.2)
BASOPHILS NFR BLD AUTO: 1 %
BILIRUBIN DIRECT+TOT PNL SERPL-MCNC: 0.6 MG/DL
BUN SERPL-MCNC: 17.1 MG/DL (ref 9.8–20.1)
CALCIUM SERPL-MCNC: 9.7 MG/DL (ref 8.4–10.2)
CHLORIDE SERPL-SCNC: 106 MMOL/L (ref 98–107)
CO2 SERPL-SCNC: 25 MMOL/L (ref 22–29)
CREAT SERPL-MCNC: 1.23 MG/DL (ref 0.55–1.02)
EOSINOPHIL # BLD AUTO: 0.17 X10(3)/MCL (ref 0–0.9)
EOSINOPHIL NFR BLD AUTO: 2.7 %
ERYTHROCYTE [DISTWIDTH] IN BLOOD BY AUTOMATED COUNT: 13.8 % (ref 11.5–17)
GFR SERPLBLD CREATININE-BSD FMLA CKD-EPI: 51 MLS/MIN/1.73/M2
GLOBULIN SER-MCNC: 4 GM/DL (ref 2.4–3.5)
GLUCOSE SERPL-MCNC: 165 MG/DL (ref 74–100)
HCT VFR BLD AUTO: 44.4 % (ref 37–47)
HGB BLD-MCNC: 13.7 G/DL (ref 12–16)
IMM GRANULOCYTES # BLD AUTO: 0.01 X10(3)/MCL (ref 0–0.04)
IMM GRANULOCYTES NFR BLD AUTO: 0.2 %
LYMPHOCYTES # BLD AUTO: 2.52 X10(3)/MCL (ref 0.6–4.6)
LYMPHOCYTES NFR BLD AUTO: 39.9 %
MCH RBC QN AUTO: 26.9 PG
MCHC RBC AUTO-ENTMCNC: 30.9 G/DL (ref 33–36)
MCV RBC AUTO: 87.1 FL (ref 80–94)
MONOCYTES # BLD AUTO: 0.39 X10(3)/MCL (ref 0.1–1.3)
MONOCYTES NFR BLD AUTO: 6.2 %
NEUTROPHILS # BLD AUTO: 3.16 X10(3)/MCL (ref 2.1–9.2)
NEUTROPHILS NFR BLD AUTO: 50 %
NRBC BLD AUTO-RTO: 0 %
PLATELET # BLD AUTO: 320 X10(3)/MCL (ref 130–400)
PMV BLD AUTO: 9.8 FL (ref 7.4–10.4)
POTASSIUM SERPL-SCNC: 3.6 MMOL/L (ref 3.5–5.1)
PROT SERPL-MCNC: 7.8 GM/DL (ref 6.4–8.3)
RBC # BLD AUTO: 5.1 X10(6)/MCL (ref 4.2–5.4)
SODIUM SERPL-SCNC: 142 MMOL/L (ref 136–145)
WBC # SPEC AUTO: 6.3 X10(3)/MCL (ref 4.5–11.5)

## 2023-03-17 ENCOUNTER — OFFICE VISIT (OUTPATIENT)
Dept: FAMILY MEDICINE | Facility: CLINIC | Age: 58
End: 2023-03-17
Payer: MEDICAID

## 2023-03-17 VITALS
HEIGHT: 64 IN | HEART RATE: 86 BPM | RESPIRATION RATE: 18 BRPM | OXYGEN SATURATION: 99 % | DIASTOLIC BLOOD PRESSURE: 78 MMHG | BODY MASS INDEX: 50.02 KG/M2 | SYSTOLIC BLOOD PRESSURE: 136 MMHG | TEMPERATURE: 98 F | WEIGHT: 293 LBS

## 2023-03-17 DIAGNOSIS — Z12.31 BREAST CANCER SCREENING BY MAMMOGRAM: ICD-10-CM

## 2023-03-17 DIAGNOSIS — I10 HYPERTENSION, UNSPECIFIED TYPE: Primary | ICD-10-CM

## 2023-03-17 DIAGNOSIS — Z12.11 COLON CANCER SCREENING: ICD-10-CM

## 2023-03-17 DIAGNOSIS — M17.0 PRIMARY OSTEOARTHRITIS OF BOTH KNEES: ICD-10-CM

## 2023-03-17 PROCEDURE — 20610 DRAIN/INJ JOINT/BURSA W/O US: CPT | Mod: PBBFAC,LT | Performed by: FAMILY MEDICINE

## 2023-03-17 PROCEDURE — 99215 OFFICE O/P EST HI 40 MIN: CPT | Mod: PBBFAC,25

## 2023-03-17 RX ORDER — LIDOCAINE HYDROCHLORIDE 10 MG/ML
5 INJECTION, SOLUTION EPIDURAL; INFILTRATION; INTRACAUDAL; PERINEURAL
Status: COMPLETED | OUTPATIENT
Start: 2023-03-17 | End: 2023-03-17

## 2023-03-17 RX ORDER — AMLODIPINE BESYLATE 5 MG/1
10 TABLET ORAL DAILY
Qty: 90 TABLET | Refills: 1 | Status: SHIPPED | OUTPATIENT
Start: 2023-03-17 | End: 2023-06-16 | Stop reason: SDUPTHER

## 2023-03-17 RX ORDER — TRIAMCINOLONE ACETONIDE 40 MG/ML
40 INJECTION, SUSPENSION INTRA-ARTICULAR; INTRAMUSCULAR ONCE
Status: COMPLETED | OUTPATIENT
Start: 2023-03-17 | End: 2023-03-17

## 2023-03-17 RX ORDER — VALSARTAN 160 MG/1
160 TABLET ORAL DAILY
Qty: 90 TABLET | Refills: 1 | Status: SHIPPED | OUTPATIENT
Start: 2023-03-17

## 2023-03-17 RX ORDER — HYDROCHLOROTHIAZIDE 12.5 MG/1
12.5 CAPSULE ORAL DAILY
Qty: 90 CAPSULE | Refills: 1 | Status: SHIPPED | OUTPATIENT
Start: 2023-03-17

## 2023-03-17 RX ADMIN — LIDOCAINE HYDROCHLORIDE 50 MG: 10 INJECTION, SOLUTION EPIDURAL; INFILTRATION; INTRACAUDAL; PERINEURAL at 09:03

## 2023-03-17 RX ADMIN — TRIAMCINOLONE ACETONIDE 40 MG: 40 INJECTION, SUSPENSION INTRA-ARTICULAR; INTRAMUSCULAR at 09:03

## 2023-03-17 NOTE — PROGRESS NOTES
Discussed with resident at time of encounter 03-17-23.  Bilateral knee discomfort; degenerative changes; prior injection helpful.  Recent labwork.  HTN.  Injections completed per resident; no complications.  Resident's note reviewed 03-17-23.  Agree with assessment; plan of care appropriate.  Monitor creatinine.  Professional services provided in an outpatient primary care center affiliated with a [facility] institution.

## 2023-03-17 NOTE — PROGRESS NOTES
[facility] OFFICE VISIT NOTE  Rosalee Prince  [id number]  03/17/2023      Chief Complaint: Follow-up (Bilateral Knee Injections ) and Results (Lab Results)      HPI    Rosalee Prince is a 57 y.o. female  presenting to [facility] for HTN and bilateral knee OA.    HTN  -last visit Amlodipine was increased from 5 to 10 mg qd.   -Continued HCTZ 12.5 mg qd and Valsartan 160 mg qd  -BP at home: 130-140/70-80  -denies chest pain, sob, headaches    OA of bilateral knees  -She has had CSI multiple times and also reports moderate relief.  Requesting bilateral steroid injections today.   -XR L and R knee: 1/26/21: Degenerative changes  -Has tried Mobic in the past with moderate relief.  Currently only taking Acetaminophen for pain  -Never completed PT    ROS:  CONSTITUTIONAL: No weight loss, fever, chills, or weakness.    CARDIOVASCULAR: No chest pain, chest pressure, or chest discomfort. No palpitations.    RESPIRATORY: No shortness of breath, cough, or sputum.    GASTROINTESTINAL: No nausea, vomiting or diarrhea. No abdominal pain.  MUSCULOSKELETAL: +bilateral knee pain     Vitals:    03/17/23 0903   BP: 136/78   Pulse: 86   Resp: 18   Temp: 98 °F (36.7 °C)        PE:  General: appears well, in no acute distress   Respiratory: clear to auscultation bilaterally, nonlabored respirations   Cardiovascular: regular rate and rhythm without murmurs or gallops, no edema in bilateral lower extremities   Musculoskeletal: bilateral knees crepitus on passive ROM and tenderness to palpation of medial and lateral joint line      Procedure Note     Date: 03/17/2023  Time:  9:20 AM CDT     Procedure: bilateral Knee Injection     Indications: Therapeutic Indication - Decrease pain, Increase range of motion and improve quality of life:   Risks:  Possible complications with the injection include bleeding, infection (.01%), tendon rupture, steroid flare, fat pad or soft tissue atrophy, skin depigmentation, allergic reaction to medications and  vasovagal response. (steroid flare treatment is rest, ice, NSAIDs and resolves in 24-36 hours.)  Consent:  Procedure, risks, benefits, and alternatives explained the patient, who voiced understanding and agreed to proceed with procedure.  Consent signed and scanned into the medical record.   No absolute contraindications (cellulitis overlying joint, infection, lack of informed consent, allergy to injection medication, AVN protein or egg allergy for sodium hyaluronate, or history of steroid flare) or relative contraindications (uncontrolled DM2 A1c>10, coagulopathy, INR > 3.5, previous joint replacement or history of AVN).        Staff: Jesus Kim MD   Description:  Time-out performed.  The patient was prepped in normal sterile fashion use of chlorhexidine scrub and the appropriate and anatomic landmarks were identified without ultrasound.  Sterile 21 gauge 1.5 inch  was used. Topical ethyl chloride was applied. Contents of syringe included: 5cc of 1% of lidocaine with 40mg of Kenalog   Complications: None   EBL: None   Post Procedure: Patient alert, and moving all extremities. ROM improved, pain decreased.  Good peripheral pulses, no signs of vascular compromise and range of motion intact.  Aftercare instructions were given to patient at time of discharge.  Relative rest for 3 days-avoiding excess activity.  Place ice on the area for 15 minutes every 4-6 hours. Patient may take Tylenol a 1000 mg b.i.d. for the next 3-4 days if not on medication already and safe to take pending co-morbidities.  Protect the area for the next 1-8 hours if anesthetic was used.  Avoid excessive activity for the next 3-4 weeks.  ER precautions given for fever, severe joint pain or allergic reaction or other new symptoms related to the joint injection.       Current Medications:   Current Outpatient Medications   Medication Sig Dispense Refill    acetaZOLAMIDE (DIAMOX) 500 mg CpSR Take 501 capsules by mouth 2 (two) times daily.       amLODIPine (NORVASC) 5 MG tablet Take 2 tablets (10 mg total) by mouth once daily. 90 tablet 1    atorvastatin (LIPITOR) 40 MG tablet Take 1 tablet by mouth once daily.      benzonatate (TESSALON) 200 MG capsule Take 1 capsule by mouth 3 (three) times daily.      fluticasone furoate (ARNUITY ELLIPTA) 100 mcg/actuation inhaler Inhale 1 puff into the lungs every 24 hours as needed.      hydroCHLOROthiazide (MICROZIDE) 12.5 mg capsule Take 1 capsule (12.5 mg total) by mouth once daily. 90 capsule 1    levocetirizine (XYZAL) 5 MG tablet Take 1 tablet (5 mg total) by mouth every evening. for 14 days 14 tablet 0    metFORMIN (GLUCOPHAGE) 500 MG tablet Take 1 tablet by mouth 2 (two) times daily.      promethazine-dextromethorphan (PROMETHAZINE-DM) 6.25-15 mg/5 mL Syrp Take 5 mLs by mouth every 6 (six) hours as needed (cough). 100 mL 0    topiramate (TROKENDI XR) 50 mg Cp24 Take 51 capsules by mouth once daily.      valsartan (DIOVAN) 160 MG tablet Take 1 tablet (160 mg total) by mouth once daily. 90 tablet 1     No current facility-administered medications for this visit.       Assessment:   1. Hypertension, unspecified type    2. Primary osteoarthritis of both knees    3. Breast cancer screening by mammogram    4. Colon cancer screening        Plan:    HTN  -stable    -Continue HCTZ 12.5 mg qd, Valsartan 160 mg qd and Amlodipine 10mg qd.  Refills provided today.    -Low Sodium Diet (DASH Diet - Less than 2 grams of sodium per day).  -Monitor blood pressure daily and log. Report consistent numbers greater than 140/90.  -Maintain healthy weight with goal BMI <30. Exercise 30 minutes per day, 5 days per week.    Bilateral Knee OA   -bilateral knee kenalog injections given today  -after care instructions provided  -home exercises provided  -avoid NSAIDs 2/2 bump in kidney function on last cmp  -okay to use Acetaminophen prn for pain and diclofenac gel    Breast Cancer Screening  -mammogram ordered today     Colon Cancer  Screening  -cologuard ordered today  -no self or family hx of colon cancer  -patient declines colonoscopy today.     Immunizations Due  -offered Shingrix and Pneumonia shots today.  Patient declined.  Risk vs benefit discussed.     Return to clinic in 3 months, or sooner if needed.   -next apt needs bmp (kidney function) and A1c    Irma Laboy M.D.  [facility]

## 2023-04-09 LAB — NONINV COLON CA DNA+OCC BLD SCRN STL QL: NEGATIVE

## 2023-06-16 DIAGNOSIS — I10 HYPERTENSION, UNSPECIFIED TYPE: ICD-10-CM

## 2023-06-16 RX ORDER — AMLODIPINE BESYLATE 5 MG/1
10 TABLET ORAL DAILY
Qty: 90 TABLET | Refills: 1 | Status: SHIPPED | OUTPATIENT
Start: 2023-06-16

## 2023-12-11 ENCOUNTER — HOSPITAL ENCOUNTER (EMERGENCY)
Facility: HOSPITAL | Age: 58
Discharge: HOME | End: 2023-12-11
Attending: STUDENT IN AN ORGANIZED HEALTH CARE EDUCATION/TRAINING PROGRAM
Payer: MEDICAID

## 2023-12-11 VITALS
BODY MASS INDEX: 50.02 KG/M2 | HEART RATE: 82 BPM | WEIGHT: 293 LBS | HEIGHT: 64 IN | OXYGEN SATURATION: 96 % | RESPIRATION RATE: 20 BRPM | TEMPERATURE: 98 F | SYSTOLIC BLOOD PRESSURE: 163 MMHG | DIASTOLIC BLOOD PRESSURE: 110 MMHG

## 2023-12-11 DIAGNOSIS — R32 URINARY INCONTINENCE, UNSPECIFIED TYPE: Primary | ICD-10-CM

## 2023-12-11 LAB
APPEARANCE UR: CLEAR
BACTERIA #/AREA URNS AUTO: ABNORMAL /HPF
BILIRUB UR QL STRIP.AUTO: NEGATIVE
COLOR UR AUTO: YELLOW
GLUCOSE UR QL STRIP.AUTO: NORMAL
HYALINE CASTS #/AREA URNS LPF: ABNORMAL /LPF
KETONES UR QL STRIP.AUTO: NEGATIVE
LEUKOCYTE ESTERASE UR QL STRIP.AUTO: NEGATIVE
MUCOUS THREADS URNS QL MICRO: ABNORMAL /LPF
NITRITE UR QL STRIP.AUTO: NEGATIVE
PH UR STRIP.AUTO: 5.5 [PH]
PROT UR QL STRIP.AUTO: ABNORMAL
RBC #/AREA URNS AUTO: ABNORMAL /HPF
RBC UR QL AUTO: NEGATIVE
SP GR UR STRIP.AUTO: 1.03 (ref 1–1.03)
SQUAMOUS #/AREA URNS LPF: ABNORMAL /HPF
UROBILINOGEN UR STRIP-ACNC: ABNORMAL
WBC #/AREA URNS AUTO: ABNORMAL /HPF

## 2023-12-11 PROCEDURE — 81001 URINALYSIS AUTO W/SCOPE: CPT | Performed by: STUDENT IN AN ORGANIZED HEALTH CARE EDUCATION/TRAINING PROGRAM

## 2023-12-11 PROCEDURE — 99284 EMERGENCY DEPT VISIT MOD MDM: CPT | Mod: 25

## 2023-12-11 NOTE — ED PROVIDER NOTES
"Encounter Date: 12/11/2023       History     Chief Complaint   Patient presents with    urinary problem     Reports urinary incontinence for a couple weeks and lower back pain for "a while" that has intensified. "It just falls out". Denies any numbness, injury, or heavy lifting.      Patient presents to the emergency department due to urinary incontinence.  She states for the last 4 days, she was had issues just urinating on herself.  She states she can just be sitting there and she was noticed that she was urinated on herself.  She also states she was an increase in her baseline lower back pain recently.  This is chronic for years but states it was worsened over the last month.  She denies any numbness in her groin, no paresthesias in her lower extremities and no weakness in his lower extremities.    The history is provided by the patient.     Review of patient's allergies indicates:  No Known Allergies  No past medical history on file.  Past Surgical History:   Procedure Laterality Date    drainage of spinal cord N/A 09/05/2017    SIGMOIDOSCOPY N/A 11/09/2017    TOTAL ABDOMINAL HYSTERECTOMY       No family history on file.  Social History     Tobacco Use    Smoking status: Never    Smokeless tobacco: Never     Review of Systems   Constitutional:  Negative for chills and fever.   HENT:  Negative for congestion and sore throat.    Respiratory:  Negative for cough and shortness of breath.    Cardiovascular:  Negative for chest pain and palpitations.   Gastrointestinal:  Negative for abdominal pain and nausea.   Genitourinary:  Negative for dysuria and hematuria.   Musculoskeletal:  Positive for back pain. Negative for arthralgias and myalgias.   Neurological:  Negative for dizziness and weakness.       Physical Exam     Initial Vitals [12/11/23 1615]   BP Pulse Resp Temp SpO2   (!) 202/135 106 20 98.1 °F (36.7 °C) 97 %      MAP       --         Physical Exam    Nursing note and vitals reviewed.  Constitutional: She " appears well-developed and well-nourished.   HENT:   Head: Normocephalic and atraumatic.   Eyes: EOM are normal. Pupils are equal, round, and reactive to light.   Neck: Neck supple.   Normal range of motion.  Cardiovascular:  Normal rate and regular rhythm.           Pulmonary/Chest: Breath sounds normal. No respiratory distress.   Abdominal: Abdomen is soft. There is no abdominal tenderness.   Musculoskeletal:         General: No edema. Normal range of motion.      Cervical back: Normal range of motion and neck supple.     Neurological: She is alert and oriented to person, place, and time.   Skin: Skin is warm and dry.         ED Course   Procedures  Labs Reviewed   URINALYSIS, REFLEX TO URINE CULTURE - Abnormal; Notable for the following components:       Result Value    Protein, UA 1+ (*)     Urobilinogen, UA 1+ (*)     Bacteria, UA Trace (*)     Squamous Epithelial Cells, UA Trace (*)     Mucous, UA Trace (*)     Hyaline Casts, UA 0-2 (*)     All other components within normal limits          Imaging Results              CT Lumbar Spine Without Contrast (Final result)  Result time 12/11/23 18:31:43      Final result by Noemy Johnston MD (12/11/23 18:31:43)                   Impression:      1. No acute fracture identified.  2. Multilevel degenerative changes of the lumbar spine.      Electronically signed by: Noemy Johnston  Date:    12/11/2023  Time:    18:31               Narrative:    EXAMINATION:  CT LUMBAR SPINE WITHOUT CONTRAST    CLINICAL HISTORY:  Lumbar radiculopathy, symptoms persist with conservative treatment;    TECHNIQUE:  Noncontrast CT images of the lumbar spine. Axial, coronal, and sagittal reformatted images were obtained. Dose length product is 2557 mGycm. Automatic exposure control, adjustment of mA/kV or iterative reconstruction technique was used to limit radiation dose.    COMPARISON:  X-rays dated 08/31/2017    FINDINGS:  There are 5 non-rib-bearing lumbar type vertebral bodies.   36.9 Alignment is normal.  The vertebral heights are maintained.  There are multilevel degenerative changes with disc height loss, marginal osteophyte formation and facet arthropathy.  There is likely moderate degenerative canal narrowing at L4-5.  There is severe neural foraminal narrowing bilaterally at L5-S1.  There is no paraspinal hematoma.                                       Medications - No data to display  Medical Decision Making  Lumbar spine shows chronic changes but nothing acute.  Appears the patients incontinence is urge incontinence in nature and there was no evidence urinary retention on bedside bladder scanning postvoid residual.  We will discharge to follow up with the primary care physician, return precautions were given.    Amount and/or Complexity of Data Reviewed  Radiology: ordered. Decision-making details documented in ED Course.                                      Clinical Impression:  Final diagnoses:  [R32] Urinary incontinence, unspecified type (Primary)          ED Disposition Condition    Discharge Stable          ED Prescriptions    None       Follow-up Information       Follow up With Specialties Details Why Contact Info    Vinay Neal MD Family Medicine Call  For ED follow up [address] [facility] [address]  [phone]      [facility] Emergency Medicine Go to  If symptoms worsen [address] [facility] [id number] [phone]             Indio Mock MD  12/21/23 0608

## 2024-12-18 NOTE — PROGRESS NOTES
[facility] OFFICE VISIT NOTE  Rosalee Prince  59 y.o.  [id number]  2024      Chief Complaint: Hypertension and back/knee pain    HPI:  HTN - Home BP ranges 200's/100's, attributing to knee pain. On Norvasc 10, HTCZ, valsartan  Lower back pain onset , constant, dull  (8-10/10), radiating to left leg. OTC Excedrin helps relieve  Right knee pain - onset , constant, sharp and worsen when standing (-10/10). OTC Excedrin no improvement. Using walker for ambulation   Urinary incontinence - Urinating on herself due to not to feel the urge to urinate. Onset a year ago. Episodic, and worsen past 2 weeks   HLD - Adherent to Lipitor  T2DM - Adherent to metformin  MARTIN - On CPAP. Not adherent due to not comfortable  Denies HA, vision/hearing change, CP, SOB, abdominal pain, fall, sick contact, constipation/diarrhea, anxiety/depression    PMH:  HTN  HLD  T2DM  Asthma  Migraine  MARTIN on CPAP  Morbid obesity  Elevated intracranial pressure    PSH:  Spinal cord drainage (2017)  Hysterectomy     SH:  Smoking tobacco: 0.5 ppd x 20 y. Quit 20 years ago  Drinking alcohol: Wine 1 glass during holidays   Recreational drugs: Never  Education: GED  Occupation: None  Diet: No limit  Exercise: No, due to knee pain    FH:  Father: , heart disease  Mother: , heart disease    Allergies:  NKA    HM:  Mammogram (2021): Negative (BI-RADS 1) bilaterally. Rec annually  Colorectal cancer screening (Cologuard) - Negative (4/3/2023), next due 4/3/2026    Immunizations:  Influenza   Shingles  RSV    Care Team:  Neurology - [facility]     Medications:  See list below  Current Outpatient Medications   Medication Instructions    amLODIPine (NORVASC) 10 mg, Oral, Daily    atorvastatin (LIPITOR) 40 MG tablet 1 tablet, Oral, Daily    fluticasone furoate (ARNUITY ELLIPTA) 100 mcg/actuation inhaler 1 puff, Inhalation, Every 24 hours as needed    hydroCHLOROthiazide (MICROZIDE) 12.5 mg, Oral, Daily    metFORMIN  "(GLUCOPHAGE) 500 MG tablet 1 tablet, Oral, 2 times daily    valsartan (DIOVAN) 160 mg, Oral, Daily       Vitals:    12/19/24 0906   BP: (!) 179/79   BP Location: Right arm   Patient Position: Sitting   Pulse: 73   Temp: 97.7 °F (36.5 °C)   TempSrc: Oral   SpO2: 95%   Weight: (!) 178.1 kg (392 lb 10.2 oz)   Height: 5' 4" (1.626 m)       Physical Exam  GEN: NAD, sitting on wheelchair  RESP: CTAB  CV: RRR, S1S2, no murmur  NECK: No bruit  BACK: Lumbar region tenderness. Limited mobility  EXT: Right knee tenderness along infrapatellar line, worst at medial. Passive extension (0 deg) and flexion (90 deg). No BLE edema    Assessment and Plan:    Essential HTN  Not controlled  Increased valsartan to 320 mg daily, continue all other HTN medication regimens  Recommended life style modification and diet restriction  TTE ordered due to increased pulse pressure    MARTIN  Not fully adherent to CPAP   Could possibly contribute to elevated BP  Encourage to use CPAP daily    Elevated creatinine   Up-trending of Cr: 0.88 (11/19/2021), 1.23 (3/16/2023), 1.42 (12/19/2024)  Could be caused by long-standing uncontrolled BP  Will repeat BMP next visit    HLD  Lipid profile showed total cholesterol 204 and   Recommended dietary modification   Continue Lipitor 40 mg daily     T2DM  Well controlled. A1C 5.5 (12/19/2024)  Continue metformin 500 mg BID    Morbid obesity  Long-standing elevated BMI   Dietary modification recommended   Will consider GLP1 for weight loss    OA, right knee  Right knee XR (1/26/2021) showed narrow medial knee joint with osteophytes  Continue Voltaren gel 4 times daily    Urinary incontinence, overflow   Refer to urology   UA and attempt pelvic exam next visit     Patient voices understanding and agrees to the plan discussed. All patient's questions have been answered at this time. She is scheduled RTC maxx 1/2/2025, sooner if needed.     MD ANNIE Rodriguez-Glenn, Family Medicine, [facility]  12/19/2024     "

## 2024-12-19 ENCOUNTER — OFFICE VISIT (OUTPATIENT)
Dept: FAMILY MEDICINE | Facility: CLINIC | Age: 59
End: 2024-12-19

## 2024-12-19 VITALS
HEART RATE: 73 BPM | HEIGHT: 64 IN | BODY MASS INDEX: 50.02 KG/M2 | TEMPERATURE: 98 F | WEIGHT: 293 LBS | SYSTOLIC BLOOD PRESSURE: 178 MMHG | DIASTOLIC BLOOD PRESSURE: 109 MMHG | OXYGEN SATURATION: 95 %

## 2024-12-19 DIAGNOSIS — E66.9 DIABETES MELLITUS TYPE 2 IN OBESE: ICD-10-CM

## 2024-12-19 DIAGNOSIS — E78.2 MIXED HYPERLIPIDEMIA: Primary | ICD-10-CM

## 2024-12-19 DIAGNOSIS — N39.490 OVERFLOW INCONTINENCE: ICD-10-CM

## 2024-12-19 DIAGNOSIS — E11.69 DIABETES MELLITUS TYPE 2 IN OBESE: ICD-10-CM

## 2024-12-19 DIAGNOSIS — R79.89 ELEVATED SERUM CREATININE: ICD-10-CM

## 2024-12-19 DIAGNOSIS — I10 HYPERTENSION, UNSPECIFIED TYPE: ICD-10-CM

## 2024-12-19 DIAGNOSIS — N39.41 URGE INCONTINENCE OF URINE: ICD-10-CM

## 2024-12-19 LAB
25(OH)D3+25(OH)D2 SERPL-MCNC: 28 NG/ML (ref 30–80)
ALBUMIN SERPL-MCNC: 3.7 G/DL (ref 3.4–4.8)
ALBUMIN/GLOB SERPL: 0.9 RATIO (ref 1.1–2)
ALP SERPL-CCNC: 74 UNIT/L (ref 40–150)
ALT SERPL-CCNC: 14 UNIT/L (ref 0–55)
ANION GAP SERPL CALC-SCNC: 10 MEQ/L
AST SERPL-CCNC: 11 UNIT/L (ref 5–34)
BASOPHILS # BLD AUTO: 0.06 X10(3)/MCL
BASOPHILS NFR BLD AUTO: 0.9 %
BILIRUB SERPL-MCNC: 0.6 MG/DL
BUN SERPL-MCNC: 17.9 MG/DL (ref 9.8–20.1)
CALCIUM SERPL-MCNC: 9.8 MG/DL (ref 8.4–10.2)
CHLORIDE SERPL-SCNC: 109 MMOL/L (ref 98–107)
CHOLEST SERPL-MCNC: 204 MG/DL
CHOLEST/HDLC SERPL: 5 {RATIO} (ref 0–5)
CO2 SERPL-SCNC: 25 MMOL/L (ref 23–31)
CREAT SERPL-MCNC: 1.42 MG/DL (ref 0.55–1.02)
CREAT/UREA NIT SERPL: 13
EOSINOPHIL # BLD AUTO: 0.21 X10(3)/MCL (ref 0–0.9)
EOSINOPHIL NFR BLD AUTO: 3 %
ERYTHROCYTE [DISTWIDTH] IN BLOOD BY AUTOMATED COUNT: 16.5 % (ref 11.5–17)
EST. AVERAGE GLUCOSE BLD GHB EST-MCNC: 111.2 MG/DL
GFR SERPLBLD CREATININE-BSD FMLA CKD-EPI: 42 ML/MIN/1.73/M2
GLOBULIN SER-MCNC: 4 GM/DL (ref 2.4–3.5)
GLUCOSE SERPL-MCNC: 97 MG/DL (ref 82–115)
HBA1C MFR BLD: 5.5 %
HCT VFR BLD AUTO: 44.9 % (ref 37–47)
HDLC SERPL-MCNC: 40 MG/DL (ref 35–60)
HGB BLD-MCNC: 13.5 G/DL (ref 12–16)
IMM GRANULOCYTES # BLD AUTO: 0.01 X10(3)/MCL (ref 0–0.04)
IMM GRANULOCYTES NFR BLD AUTO: 0.1 %
LDLC SERPL CALC-MCNC: 148 MG/DL (ref 50–140)
LYMPHOCYTES # BLD AUTO: 1.94 X10(3)/MCL (ref 0.6–4.6)
LYMPHOCYTES NFR BLD AUTO: 28 %
MCH RBC QN AUTO: 25.9 PG (ref 27–31)
MCHC RBC AUTO-ENTMCNC: 30.1 G/DL (ref 33–36)
MCV RBC AUTO: 86.2 FL (ref 80–94)
MONOCYTES # BLD AUTO: 0.36 X10(3)/MCL (ref 0.1–1.3)
MONOCYTES NFR BLD AUTO: 5.2 %
NEUTROPHILS # BLD AUTO: 4.35 X10(3)/MCL (ref 2.1–9.2)
NEUTROPHILS NFR BLD AUTO: 62.8 %
NRBC BLD AUTO-RTO: 0 %
PLATELET # BLD AUTO: 314 X10(3)/MCL (ref 130–400)
PMV BLD AUTO: 10.5 FL (ref 7.4–10.4)
POTASSIUM SERPL-SCNC: 3.5 MMOL/L (ref 3.5–5.1)
PROT SERPL-MCNC: 7.7 GM/DL (ref 5.8–7.6)
RBC # BLD AUTO: 5.21 X10(6)/MCL (ref 4.2–5.4)
SODIUM SERPL-SCNC: 144 MMOL/L (ref 136–145)
T4 FREE SERPL-MCNC: 0.93 NG/DL (ref 0.7–1.48)
TRIGL SERPL-MCNC: 82 MG/DL (ref 37–140)
TSH SERPL-ACNC: 4.06 UIU/ML (ref 0.35–4.94)
VIT B12 SERPL-MCNC: 306 PG/ML (ref 213–816)
VLDLC SERPL CALC-MCNC: 16 MG/DL
WBC # BLD AUTO: 6.93 X10(3)/MCL (ref 4.5–11.5)

## 2024-12-19 PROCEDURE — 85025 COMPLETE CBC W/AUTO DIFF WBC: CPT

## 2024-12-19 PROCEDURE — 83036 HEMOGLOBIN GLYCOSYLATED A1C: CPT

## 2024-12-19 PROCEDURE — 80053 COMPREHEN METABOLIC PANEL: CPT

## 2024-12-19 PROCEDURE — 82607 VITAMIN B-12: CPT

## 2024-12-19 PROCEDURE — 82306 VITAMIN D 25 HYDROXY: CPT

## 2024-12-19 PROCEDURE — 80061 LIPID PANEL: CPT

## 2024-12-19 PROCEDURE — 84443 ASSAY THYROID STIM HORMONE: CPT

## 2024-12-19 PROCEDURE — 99214 OFFICE O/P EST MOD 30 MIN: CPT | Mod: PBBFAC

## 2024-12-19 PROCEDURE — 36415 COLL VENOUS BLD VENIPUNCTURE: CPT

## 2024-12-19 PROCEDURE — 84439 ASSAY OF FREE THYROXINE: CPT

## 2024-12-19 RX ORDER — DICLOFENAC SODIUM 10 MG/G
2 GEL TOPICAL 4 TIMES DAILY
Qty: 450 G | Refills: 3 | Status: SHIPPED | OUTPATIENT
Start: 2024-12-19

## 2024-12-19 RX ORDER — FLUTICASONE FUROATE 100 UG/1
1 POWDER RESPIRATORY (INHALATION)
Qty: 30 EACH | Refills: 1 | Status: SHIPPED | OUTPATIENT
Start: 2024-12-19

## 2024-12-19 RX ORDER — METFORMIN HYDROCHLORIDE 500 MG/1
500 TABLET ORAL 2 TIMES DAILY
Qty: 180 TABLET | Refills: 0 | Status: SHIPPED | OUTPATIENT
Start: 2024-12-19 | End: 2025-03-19

## 2024-12-19 RX ORDER — AMLODIPINE BESYLATE 5 MG/1
10 TABLET ORAL DAILY
Qty: 90 TABLET | Refills: 1 | Status: SHIPPED | OUTPATIENT
Start: 2024-12-19

## 2024-12-19 RX ORDER — VALSARTAN 160 MG/1
320 TABLET ORAL DAILY
Qty: 90 TABLET | Refills: 1 | Status: SHIPPED | OUTPATIENT
Start: 2024-12-19

## 2024-12-19 RX ORDER — HYDROCHLOROTHIAZIDE 12.5 MG/1
12.5 CAPSULE ORAL DAILY
Qty: 90 CAPSULE | Refills: 1 | Status: SHIPPED | OUTPATIENT
Start: 2024-12-19

## 2024-12-21 NOTE — PROGRESS NOTES
Discussed with resident at time of encounter 12-19-24.  Pt. seen.  Most recent [facility] visit 03/2023.  ED visit 12/23: urinary incontinence; CT lumbar / post-void completed; no urological pathology.  Elevated home BP's; inconsistent CPAP use.  Bilateral chronic knee pain (R > L); intra-articular injections previously received; difficulty standing secondary to knee pain.    PE  Gen: NAD, ambulatory via wheelchair. Elevated BMI.  HEENT: no thyromegaly, carotid bruits; + acanthosis along base of neck.  Chest: lungs clear.  CV: reg. rhythm, no murmurs.  Knees: no gross deformities / overlying skin changes; no effusions; mild-mod diffuse tenderness anteriorly, more prominent along medial aspect of bilateral patella (R > L); no appreciable laxity.    Resident's note reviewed 12-21-24.  Mild upward trend in creatinine; possible intrinsic renal disease.  BP elevation; increase pulse pressure.      Agree with assessment; plan of care appropriate.  Renal workup recommended; updated urinalysis needed.  Consistent CPAP use; ECHO as ordered.  Recommend ortho evaluation (re: arthroplasty), urological evaluation.  Timed voiding.  Professional services provided in an outpatient primary care center affiliated with a teaching institution.

## 2025-01-06 NOTE — PROGRESS NOTES
[facility] OFFICE VISIT NOTE  Rosalee Prince  59 y.o.  [id number]  2025      Chief Complaint: F/U HTN management      HPI:  HTN - Home BP ranges 140's/80's, On Norvasc 10 mg , HTCZ 12.5 mg, valsartan 320 mg  Lower back pain onset , constant, dull  (8-10/10), radiating to left leg. OTC Excedrin helps relieve  Right knee pain - onset , constant, sharp and worsen when standing (8-10/10). Has not picked up Voltaren as prescribed from last visit. Using walker for ambulation   Urinary incontinence - Urinating on herself due to not to feel the urge to urinate. Waiting for urology appointment  HLD - Adherent to Lipitor  T2DM - Adherent to metformin  MARTIN - Not on CPAP b/c it broke  Life style modification - Not compliant to recommendation  Lost financial support (Medicaid)  Denies fever/chills, vision/hearing change, CP, SOB, abdominal pain, fall, sick contact, constipation/diarrhea, anxiety/depression    PMH:  HTN  HLD  T2DM  Asthma  Migraine  MARTIN on CPAP  Morbid obesity  Elevated intracranial pressure    PSH:  Spinal cord drainage (2017)  Hysterectomy     SH:  Smoking tobacco: 0.5 ppd x 20 y. Quit 20 years ago  Drinking alcohol: Wine 1 glass during holidays   Recreational drugs: Never  Education: GED  Occupation: None  Diet: No limit  Exercise: No, due to knee pain    FH:  Father: , heart disease  Mother: , heart disease    Allergies:  NKA    HM:  Mammogram (2021): Negative (BI-RADS 1) bilaterally. Rec annually  Colorectal cancer screening (Cologuard) - Negative (4/3/2023), next due 4/3/2026    Immunizations:  [facility]  Influenza   Shingles  RSV    Care Team:  Neurology - [facility]     Medications:  See list below  Current Outpatient Medications   Medication Instructions    amLODIPine (NORVASC) 10 mg, Oral, Daily    atorvastatin (LIPITOR) 40 MG tablet 1 tablet, Oral, Daily    diclofenac sodium (VOLTAREN) 2 g, Topical (Top), 4 times daily     "fluticasone furoate (ARNUITY ELLIPTA) 100 mcg/actuation inhaler 1 puff, Inhalation, Every 24 hours as needed    hydroCHLOROthiazide (MICROZIDE) 12.5 mg, Oral, Daily    metFORMIN (GLUCOPHAGE) 500 mg, Oral, 2 times daily    valsartan (DIOVAN) 320 mg, Oral, Daily       Vitals:    01/08/25 0839 01/08/25 0843   BP: (!) 195/87 (!) 179/89   BP Location: Left arm    Patient Position: Sitting    Pulse: 79    Resp: 18    Temp: 98.5 °F (36.9 °C)    TempSrc: Oral    SpO2: 96%    Weight: (!) 178.3 kg (393 lb) (!) 158.8 kg (350 lb)   Height: 5' 4" (1.626 m)        Physical Exam  GEN: NAD, sitting on wheelchair, elevated BMI, urine smell odor  HEENT: NC/AT, no bruit  RESP: CTAB  CV: RRR, S1S2, no murmur  BACK: Lumbar region tenderness. Limited mobility  EXT: Right knee tenderness along infrapatellar line, worst at medial. Passive extension (0 deg) and flexion (90 deg). No BLE edema and gross deformities     Assessment and Plan:    Essential HTN  Improved diastolic BP from last visit  Continue all HTN medication regimens  Bring home BP cuff and log next visit   Encourage to comply with recommended life style modification and diet restriction  TTE ordered (12/19/2024) due to increased pulse pressure     MARTIN  Not on CPAP, plans to get one from same company  Could possibly contribute to elevated BP  Encourage to use CPAP daily once received new CPAP     Elevated creatinine   Up-trending of Cr: 0.88 (11/19/2021), 1.23 (3/16/2023), 1.42 (12/19/2024)  Could be caused by long-standing uncontrolled BP  Repeated BMP today (1/8/2025)     HLD  Lipid profile showed total cholesterol 204 and  (12/19/2024)  Encourage to comply with recommended dietary modification   Continue Lipitor 40 mg daily      T2DM  Well controlled. A1C 5.5 (12/19/2024)  Continue metformin 500 mg BID     Morbid obesity  Long-standing elevated BMI   Dietary modification recommended   Encourage to comply with recommended dietary   Will consider GLP1 for weight loss   "   OA, bilateral knees  Repeat bilateral knees today (1/8/2025)  Apply Voltaren gel 4 times daily     Urinary incontinence, overflow   Urology referral (12/19/2024)    Financial support for medical care  Advised patient to go to [place] [phone] for case management    Patient voices understanding and agrees to the plan discussed. All patient's questions have been answered at this time. She is scheduled RTC maxx 3/8/2025, sooner if needed.      Delroy Villagran MD  [facility], [facility], -  01/08/2025

## 2025-01-08 ENCOUNTER — OFFICE VISIT (OUTPATIENT)
Dept: FAMILY MEDICINE | Facility: CLINIC | Age: 60
End: 2025-01-08

## 2025-01-08 VITALS
HEART RATE: 79 BPM | BODY MASS INDEX: 50.02 KG/M2 | HEIGHT: 64 IN | SYSTOLIC BLOOD PRESSURE: 179 MMHG | TEMPERATURE: 99 F | OXYGEN SATURATION: 96 % | RESPIRATION RATE: 18 BRPM | WEIGHT: 293 LBS | DIASTOLIC BLOOD PRESSURE: 89 MMHG

## 2025-01-08 DIAGNOSIS — R79.89 ELEVATED SERUM CREATININE: ICD-10-CM

## 2025-01-08 DIAGNOSIS — M17.0 BILATERAL PRIMARY OSTEOARTHRITIS OF KNEE: Primary | ICD-10-CM

## 2025-01-08 LAB
ANION GAP SERPL CALC-SCNC: 5 MEQ/L
BUN SERPL-MCNC: 18 MG/DL (ref 9.8–20.1)
CALCIUM SERPL-MCNC: 9.4 MG/DL (ref 8.4–10.2)
CHLORIDE SERPL-SCNC: 112 MMOL/L (ref 98–107)
CO2 SERPL-SCNC: 27 MMOL/L (ref 23–31)
CREAT SERPL-MCNC: 1.33 MG/DL (ref 0.55–1.02)
CREAT/UREA NIT SERPL: 14
GFR SERPLBLD CREATININE-BSD FMLA CKD-EPI: 46 ML/MIN/1.73/M2
GLUCOSE SERPL-MCNC: 151 MG/DL (ref 82–115)
POTASSIUM SERPL-SCNC: 3.9 MMOL/L (ref 3.5–5.1)
SODIUM SERPL-SCNC: 144 MMOL/L (ref 136–145)

## 2025-01-08 PROCEDURE — 36415 COLL VENOUS BLD VENIPUNCTURE: CPT

## 2025-01-08 PROCEDURE — 80048 BASIC METABOLIC PNL TOTAL CA: CPT

## 2025-01-08 PROCEDURE — 99214 OFFICE O/P EST MOD 30 MIN: CPT | Mod: PBBFAC

## 2025-01-08 RX ORDER — ERGOCALCIFEROL 1.25 MG/1
50000 CAPSULE ORAL
Qty: 12 CAPSULE | Refills: 1 | Status: SHIPPED | OUTPATIENT
Start: 2025-01-08

## 2025-01-08 RX ORDER — ERGOCALCIFEROL 1.25 MG/1
50000 CAPSULE ORAL
Qty: 12 CAPSULE | Refills: 1 | Status: SHIPPED | OUTPATIENT
Start: 2025-01-08 | End: 2025-01-08

## 2025-01-08 RX ORDER — ATORVASTATIN CALCIUM 40 MG/1
40 TABLET, FILM COATED ORAL DAILY
Qty: 90 TABLET | Refills: 1 | Status: SHIPPED | OUTPATIENT
Start: 2025-01-08

## 2025-01-08 NOTE — PROGRESS NOTES
I have seen the patient, reviewed the resident's history and physical, assessment, plan, and progress note. I have personally interviewed and examined the patient at bedside and: agree with the findings.     Edy Ryan MD  [facility] - Family Medicine

## 2025-01-09 ENCOUNTER — TELEPHONE (OUTPATIENT)
Dept: FAMILY MEDICINE | Facility: CLINIC | Age: 60
End: 2025-01-09

## 2025-01-09 NOTE — TELEPHONE ENCOUNTER
1/9/25    LCSW received a referral from Dr. Villagran requesting assistance in navigating insurance concerns. Patient previously had medicaid insurance coverage for many years, but recently it was discontinued. LCSW attempted to contact the patient to assess insurance concerns/needs and provide support and resource navigation. There was no answer. LCSW was unable to leave a VM as VM was not set up. LCSW will make another attempt to reach patient at a later date.

## 2025-03-12 ENCOUNTER — OFFICE VISIT (OUTPATIENT)
Dept: FAMILY MEDICINE | Facility: CLINIC | Age: 60
End: 2025-03-12

## 2025-03-12 VITALS
OXYGEN SATURATION: 93 % | TEMPERATURE: 98 F | HEART RATE: 89 BPM | WEIGHT: 293 LBS | BODY MASS INDEX: 50.02 KG/M2 | SYSTOLIC BLOOD PRESSURE: 181 MMHG | DIASTOLIC BLOOD PRESSURE: 107 MMHG | HEIGHT: 64 IN

## 2025-03-12 DIAGNOSIS — R79.89 ELEVATED SERUM CREATININE: ICD-10-CM

## 2025-03-12 DIAGNOSIS — E78.2 MIXED HYPERLIPIDEMIA: ICD-10-CM

## 2025-03-12 DIAGNOSIS — R32 URINARY INCONTINENCE, UNSPECIFIED TYPE: ICD-10-CM

## 2025-03-12 DIAGNOSIS — E11.9 TYPE 2 DIABETES MELLITUS TREATED WITHOUT INSULIN: ICD-10-CM

## 2025-03-12 DIAGNOSIS — M17.0 OSTEOARTHRITIS OF BOTH KNEES, UNSPECIFIED OSTEOARTHRITIS TYPE: ICD-10-CM

## 2025-03-12 DIAGNOSIS — E55.9 VITAMIN D DEFICIENCY: ICD-10-CM

## 2025-03-12 DIAGNOSIS — I10 PRIMARY HYPERTENSION: Primary | ICD-10-CM

## 2025-03-12 PROCEDURE — 99214 OFFICE O/P EST MOD 30 MIN: CPT | Mod: PBBFAC

## 2025-03-12 RX ORDER — ERGOCALCIFEROL 1.25 MG/1
50000 CAPSULE ORAL
Qty: 12 CAPSULE | Refills: 1 | Status: SHIPPED | OUTPATIENT
Start: 2025-03-12

## 2025-03-12 RX ORDER — VALSARTAN 320 MG/1
320 TABLET ORAL DAILY
Qty: 90 TABLET | Refills: 3 | Status: SHIPPED | OUTPATIENT
Start: 2025-03-12 | End: 2026-03-12

## 2025-03-12 RX ORDER — HYDROCHLOROTHIAZIDE 12.5 MG/1
12.5 CAPSULE ORAL DAILY
Qty: 90 CAPSULE | Refills: 1 | Status: SHIPPED | OUTPATIENT
Start: 2025-03-12

## 2025-03-12 RX ORDER — METFORMIN HYDROCHLORIDE 500 MG/1
500 TABLET ORAL 2 TIMES DAILY
Qty: 180 TABLET | Refills: 1 | Status: SHIPPED | OUTPATIENT
Start: 2025-03-12 | End: 2025-06-10

## 2025-03-12 RX ORDER — DICLOFENAC SODIUM 10 MG/G
2 GEL TOPICAL 4 TIMES DAILY
Qty: 450 G | Refills: 3 | Status: SHIPPED | OUTPATIENT
Start: 2025-03-12

## 2025-03-12 RX ORDER — ATORVASTATIN CALCIUM 40 MG/1
40 TABLET, FILM COATED ORAL DAILY
Qty: 90 TABLET | Refills: 1 | Status: SHIPPED | OUTPATIENT
Start: 2025-03-12

## 2025-03-12 RX ORDER — VALSARTAN 160 MG/1
320 TABLET ORAL DAILY
Qty: 90 TABLET | Refills: 1 | Status: SHIPPED | OUTPATIENT
Start: 2025-03-12 | End: 2025-03-12

## 2025-03-12 RX ORDER — AMLODIPINE BESYLATE 5 MG/1
10 TABLET ORAL DAILY
Qty: 90 TABLET | Refills: 1 | Status: SHIPPED | OUTPATIENT
Start: 2025-03-12 | End: 2025-03-12 | Stop reason: DRUGHIGH

## 2025-03-12 RX ORDER — FLUTICASONE FUROATE 100 UG/1
1 POWDER RESPIRATORY (INHALATION)
Qty: 30 EACH | Refills: 1 | Status: SHIPPED | OUTPATIENT
Start: 2025-03-12

## 2025-03-12 RX ORDER — AMLODIPINE BESYLATE 10 MG/1
10 TABLET ORAL DAILY
Qty: 90 TABLET | Refills: 3 | Status: SHIPPED | OUTPATIENT
Start: 2025-03-12 | End: 2026-03-12

## 2025-03-12 NOTE — PROGRESS NOTES
[facility] OFFICE VISIT NOTE  Rosalee Prince  59 y.o.  [id number]  2025      Chief Complaint: Elevated BP      HPI:  HTN - Not controlled. On Norvasc 10, HTCZ 12.5 and valsartan 320  Urinary incontinence - Urinating on herself due to not to feel the urge to urinate. Waiting for urology appointment  Chronic knee pain - Pt. has not gone to radiology for knee XR  HLD - On Lipitor  T2DM - Well controlled with metformin  Denies fever/chills, vision/hearing change, CP, SOB, abdominal pain, fall, sick contact, constipation/diarrhea, anxiety/depression    PMH:  HTN  HLD  T2DM  Asthma  Migraine  MARTIN on CPAP  Morbid obesity  Elevated intracranial pressure    PSH:  Spinal cord drainage (2017)  Hysterectomy     SH:  Smoking tobacco: 0.5 ppd x 20 y. Quit 20 years ago  Drinking alcohol: Wine 1 glass during holidays   Recreational drugs: Never  Education: GED  Occupation: None  Diet: No limit  Exercise: No, due to knee pain    FH:  Father: , heart disease  Mother: , heart disease    Allergies:  NKA    HM:  Mammogram (2021): Negative (BI-RADS 1) bilaterally. Rec annually  Colorectal cancer screening (Cologuard) - Negative (4/3/2023), next due 4/3/2026    Immunizations:  Will receive immunizations at [facility]  Influenza   Shingles  RSV    Care Team:  Neurology - [facility] Neu[facility]     Medications:  See list below  Current Outpatient Medications   Medication Instructions    amLODIPine (NORVASC) 10 mg, Oral, Daily    atorvastatin (LIPITOR) 40 mg, Oral, Daily    diclofenac sodium (VOLTAREN) 2 g, Topical (Top), 4 times daily    ergocalciferol (VITAMIN D2) 50,000 Units, Oral, Every 7 days    fluticasone furoate (ARNUITY ELLIPTA) 100 mcg/actuation inhaler 1 puff, Inhalation, Every 24 hours as needed    hydroCHLOROthiazide (MICROZIDE) 12.5 mg, Oral, Daily    metFORMIN (GLUCOPHAGE) 500 mg, Oral, 2 times daily    valsartan (DIOVAN) 320 mg, Oral, Daily       Vitals:    25 1524 25 1528 25 1530  "  BP: (!) 166/113 (!) 169/128 (!) 181/107   BP Location: Right arm Left arm Right arm   Patient Position: Sitting Sitting Sitting   Pulse: 89     Temp: 98.1 °F (36.7 °C)     TempSrc: Oral     SpO2: (!) 93%     Weight: (!) 158.8 kg (350 lb)     Height: 5' 4" (1.626 m)           Physical Exam  GEN: NAD, sitting on wheelchair, elevated BMI  HEENT: NC/AT, no bruit  RESP: CTAB  CV: RRR, S1S2, no murmur  Skin: B/L thigh dry, no blistered noted  EXT: Right knee tenderness along infrapatellar line, worst at medial. Passive extension (0 deg) and flexion (90 deg). No BLE edema and gross deformities     Assessment and Plan:    Essential HTN  /107 today at office  Advised to adhere to all HTN medication regimens, refilled today (3/12/2025)  Encourage to comply with recommended life style modification and diet restriction  Bring home BP log and all medications to next visit  (1 week)    Elevated creatinine   Decreased from last visit (1.42 to 1.33)  Could be caused by long-standing uncontrolled BP     HLD  Lipid profile showed total cholesterol 204 and  (12/19/2024)  Encourage to comply with recommended dietary modification   Continue Lipitor 40 mg daily, refilled today (3/12/2025)     T2DM  Well controlled. A1C 5.5 (12/19/2024)  Continue metformin 500 mg BID, refilled today (3/12/2025)    Vitamin D deficiency  Vit D 28 (12/19/2024)  Start ergocalciferol 50,000 unit weekly      OA, bilateral knees  XR bilateral knees ordered (1/8/2025)  Pt. has not had XR due to no transportation; plan to go this week  Apply Voltaren gel 4 times daily     Urinary incontinence, overflow   Urology referral (12/19/2024)  Per urology, attempted to call 3x, no responses  Gave Pt. phone number to call for making appointment    Patient voices understanding and agrees to the plan discussed. All patient's questions have been answered at this time. She is scheduled RTC in 1 week for BP check, sooner if needed.      Delroy Villagran, " MD [facility], Family Medicine, HO-1  03/12/2025

## 2025-03-12 NOTE — PROGRESS NOTES
I have reviewed the patient's positive depression score. After discussing with the patient, I have determined that no other intervention is needed at this time.  I have reviewed the patient's positive depression score. Recommendation based on score is ***.  I have used clinical judgement based on duration and functional status to consider definite necessity for treatment. ***

## 2025-03-13 NOTE — PROGRESS NOTES
Faculty Attestation: Rosalee Prince  was seen in [facility]. Patient seen and evaluated at the time of the visit. History of Present Illness, Physical Exam, and Assessment and Plan documented by resident reviewed. I participated in the management of the patient and was immediately available throughout the encounter. Treatment plan is reasonable and appropriate. Compliance with treatment recommendations is important.  Services were furnished in a primary care center in the outpatient department at a [facility] hospital.     Rowena Schmitt MD  Family Medicine      Insurance: Medicaid  Patient Type: Established patient to [facility]

## 2025-04-14 ENCOUNTER — HOSPITAL ENCOUNTER (EMERGENCY)
Facility: HOSPITAL | Age: 60
Discharge: HOME | End: 2025-04-14
Attending: EMERGENCY MEDICINE

## 2025-04-14 ENCOUNTER — TELEPHONE (OUTPATIENT)
Dept: FAMILY MEDICINE | Facility: CLINIC | Age: 60
End: 2025-04-14

## 2025-04-14 VITALS
OXYGEN SATURATION: 99 % | SYSTOLIC BLOOD PRESSURE: 158 MMHG | RESPIRATION RATE: 16 BRPM | HEART RATE: 86 BPM | TEMPERATURE: 97 F | WEIGHT: 293 LBS | BODY MASS INDEX: 60.08 KG/M2 | DIASTOLIC BLOOD PRESSURE: 92 MMHG

## 2025-04-14 DIAGNOSIS — Z13.9 ENCOUNTER FOR MEDICAL SCREENING EXAMINATION: Primary | ICD-10-CM

## 2025-04-14 PROCEDURE — 99283 EMERGENCY DEPT VISIT LOW MDM: CPT

## 2025-04-14 NOTE — ED PROVIDER NOTES
Encounter Date: 4/14/2025       History     Chief Complaint   Patient presents with    Mental Health Problem     Brought in via ambulance with police under an OPC.  See OPC for further     59-year-old female is brought under OPC today, after her children initiated the process climbing patient not caring for herself, with features children found concerning for depression.  Patient arrives in the emergency department alert, cooperative, appropriate, oriented.  Patient is understanding of the situation intact.  Speech is logical and goal-directed.  Features unconcerning for decompensated mental health condition.        Review of patient's allergies indicates:  No Known Allergies  History reviewed. No pertinent past medical history.  Past Surgical History:   Procedure Laterality Date    drainage of spinal cord N/A 09/05/2017    SIGMOIDOSCOPY N/A 11/09/2017    TOTAL ABDOMINAL HYSTERECTOMY       No family history on file.  Social History[1]  Review of Systems    Physical Exam     Initial Vitals [04/14/25 1350]   BP Pulse Resp Temp SpO2   (!) 161/103 86 16 97.3 °F (36.3 °C) 98 %      MAP       --         Physical Exam    Constitutional: She appears well-developed and well-nourished.   HENT:   Head: Normocephalic and atraumatic.   Eyes: Conjunctivae and EOM are normal. Pupils are equal, round, and reactive to light.   Neck: No tracheal deviation present.   Normal range of motion.  Cardiovascular:  Normal rate, regular rhythm and normal heart sounds.           Pulmonary/Chest: Breath sounds normal. No respiratory distress. She exhibits no tenderness.   Abdominal: Abdomen is soft. She exhibits no distension. There is no abdominal tenderness. There is no rebound.   Musculoskeletal:         General: No tenderness. Normal range of motion.      Cervical back: Normal range of motion.     Neurological: She is alert and oriented to person, place, and time. GCS score is 15. GCS eye subscore is 4. GCS verbal subscore is 5. GCS motor  subscore is 6.   Skin: No rash and no abscess noted.   Psychiatric: She has a normal mood and affect. Her behavior is normal. Judgment and thought content normal.         ED Course   Procedures  Labs Reviewed - No data to display       Imaging Results    None          Medications - No data to display  Medical Decision Making  Amount and/or Complexity of Data Reviewed  External Data Reviewed: notes.    Risk  Risk Details: Patient is here without complaints other than the desire to be discharged home.  There are no elements of today's OPC raising concerns for a decompensated mental health condition.  Patient's mental status more than sufficient to cooperate with informed decision-making and seeks no further medical evaluation at present.  We simply find no features suggestive that there exists an emergent risk to life or health of patient or any identified others.  She is medically cleared and discharged in stable condition without event.                                      Clinical Impression:  Final diagnoses:  [Z13.9] Encounter for medical screening examination (Primary)          ED Disposition Condition    Discharge Stable          ED Prescriptions    None       Follow-up Information       Follow up With Specialties Details Why Contact Info    [facility] Emergency Medicine  As needed, If symptoms worsen [address] [facility] [address] [phone]    Delroy Villagran MD Family Medicine Call   [address] [facility] [address] [phone]                 [1]   Social History  Tobacco Use    Smoking status: Former     Types: Cigarettes    Smokeless tobacco: Never    Tobacco comments:     Patient reports quitting 20 yrs. ago        Aaron Shaffer MD  04/14/25 0921

## 2025-04-14 NOTE — TELEPHONE ENCOUNTER
4/14/25    [facility] was contacted by Dr. Villagran requesting resources for potential self neglect. Patient was scheduled for an appointment today with Dr. Villagran but the patient's daughter called to report patient unable to get out of bed to come to the appointment.     [facility] received a call today from patient's daughter, Any, who reports patient is currently being transferred to [facility] ER via ambulance with an OPC. Any reports that patient is unable to care for herself and needs inpatient care, such as a skilled nursing facility. [facility] encouraged Any to discuss this concern with the  in the ER. Any plans to go to the Medicaid office then to [facility] ER to meet patient. [facility] to call [facility] if further assistance is needed.   ----------------------------------  4/16/25    PAUL received a call from patient's daughter, Any Prince, who provided [facility] with an update on patient. Any advised that patient was discharged home on 4/14/25 after patient was OPCed to the ER. Since being home, Any reports that the patient has not allowed family to care for her. Any obtained a second OPC and patient is now en route to [facility]. [facility] again encouraged Any to speak with the ER Case Management Team. In the event patient is discharged after OPC today, Any plans to call [facility] to schedule an appointment with patient's PCP. [facility] encouraged Any to call [facility] if additional support and resource navigation is needed.

## 2025-07-10 ENCOUNTER — TELEPHONE (OUTPATIENT)
Dept: FAMILY MEDICINE | Facility: CLINIC | Age: 60
End: 2025-07-10
Payer: MEDICAID

## 2025-07-10 NOTE — TELEPHONE ENCOUNTER
Attempted to call patient about mammogram, call can't be completed at this time unable to leave message.

## 2025-07-18 ENCOUNTER — TELEPHONE (OUTPATIENT)
Dept: FAMILY MEDICINE | Facility: CLINIC | Age: 60
End: 2025-07-18
Payer: MEDICAID

## 2025-07-18 DIAGNOSIS — M17.0 OSTEOARTHRITIS OF BOTH KNEES, UNSPECIFIED OSTEOARTHRITIS TYPE: Primary | ICD-10-CM

## 2025-07-18 NOTE — TELEPHONE ENCOUNTER
Called patient, but no response. I called daughter, Any, to informed I placed orders her request for hospital bed and wheelchair. I will inform the  to schedule her for virtual visit for next appointment as well.     Delroy Villagran MD  [facility], -1  07/18/2025

## 2025-07-23 NOTE — PROGRESS NOTES
[facility] OFFICE VISIT NOTE    Rosalee Prince  59 y.o.  [id number]  07/24/2025      Chief Complaint: Knee and back pain    HPI:  Current issue  Presents with sister, May Root, via stretcher from [facility]   Requests home health, hospital bed, wheelchair, and lift      Hx of OPC  Was brought to [facility] ED and [facility] ED in 4/2025, both times under OPC by daughters d/t patient not caring for herself  Urinating on herself and refusing to be cleaned  Refusing to go to doctors' appointments  Stopping walking (used to walk with walker)  Per ED notes, patient features unconcerning for decompensated mental health condition    HTN: On Norvasc, HTCZ, and Valsartan  BP today 179/90 and 146/98 (manually repeated) at office   Home Un092w/80s  Inconsistent CPAP use     HLD: On Lipitor   Lipid profile (12/2024): Total cholesterol 204 and     DM: On Metformin   Last A1C (12/2024): 5.5    Vitamin D deficiency: On Vitamin D2  Vit D 28 (12/19/2024)    Elevated BMI  BMI today 60.08 at office      Chronic back pain  Pain worsen last night and rates 10/10 severity  CT lumbar spine (12/11/2023) showed multilevel degenerative changes with disc height loss, marginal osteophyte formation and facet arthropathy, moderate degenerative canal narrowing at L4-5, and severe neural foraminal narrowing bilaterally at L5-S1     Chronic knee pain, bilaterally  Pain worsen last night and rates 10/10 severity   Mildly relieved with Voltaren  Impeded patient from standing and walking for months  Knee XR (4/16/2025): 3 views of bilateral knees   B/L: No evidence of acute fracture, dislocation or destructive lesions.  Moderate tricompartment degenerative joint disease, most pronounced in medial joint compartment where there is osteophytic lipping and bone-on-bone configuration    Skin lesions  Dispersed on abdomen, back, thighs and under breasts   States some caused pain and some caused itchiness   Using Monistat w/ some improved and healed      Urinary incontinence   Per urology referral, unable to contact patient (3 attempts)   States contact her sister, May. Do not contact daughters    Elevated creatinine   Decreased from 1.42 (2024) to 1.33 (2025)    ROS  Denies fever/chills, vision/hearing change, CP, SOB, abdominal pain, fall, sick contact, constipation/diarrhea, anxiety/depression       PMH:  HTN  HLD  T2DM  Asthma  Migraine  MARTIN on CPAP  Morbid obesity  Elevated intracranial pressure    PSH:  Spinal cord drainage (2017)  Hysterectomy     SH:  Smoking tobacco: 0.5 ppd x 20 y. Quit 20 years ago  Drinking alcohol: Wine 1 glass during holidays   Recreational drugs: Never  Education: GED  Occupation: None  Diet: No limit  Exercise: No, due to knee pain    FH:  Father: , heart disease  Mother: , heart disease    Allergies:  NKA    HM:  Mammogram (2021): Negative (BI-RADS 1) bilaterally. Rec annually  Colorectal cancer screening (Cologuard) - Negative (4/3/2023), next due 4/3/2026    Immunizations:  Will receive immunizations at [facility]  Influenza   Shines  RSV    Care Team:  Neurology - [facility]     Medications:  See list below  Current Outpatient Medications   Medication Instructions    amLODIPine (NORVASC) 10 mg, Oral, Daily    amoxicillin-clavulanate 875-125mg (AUGMENTIN) 875-125 mg per tablet 1 tablet, Oral, Every 12 hours    atorvastatin (LIPITOR) 40 mg, Oral, Daily    diclofenac sodium (VOLTAREN) 2 g, Topical (Top), 4 times daily    ergocalciferol (VITAMIN D2) 50,000 Units, Oral, Every 7 days    fluconazole (DIFLUCAN) 100 mg, Oral, Daily    fluticasone furoate (ARNUITY ELLIPTA) 100 mcg/actuation inhaler 1 puff, Inhalation, Every 24 hours as needed    gabapentin (NEURONTIN) 100 mg, Oral, 3 times daily    hydroCHLOROthiazide (MICROZIDE) 12.5 mg, Oral, Daily    HYDROcodone-acetaminophen (NORCO) 7.5-325 mg per tablet 1 tablet, Oral, Every 6 hours PRN    metFORMIN (GLUCOPHAGE) 500 mg, Oral, 2 times daily  "   nystatin (MYCOSTATIN) powder Topical (Top), 2 times daily    predniSONE (DELTASONE) 20 mg, Oral, 2 times daily    valsartan (DIOVAN) 320 mg, Oral, Daily       Vitals:    07/24/25 1533 07/24/25 1600   BP: (!) 179/90 (!) 146/98   BP Location: Right forearm Right forearm   Patient Position: Sitting Lying   Pulse: 64    Resp: 18    Temp: 98.1 °F (36.7 °C)    TempSrc: Oral    SpO2: 95%    Weight: (!) 158.8 kg (350 lb)    Height: 5' 4" (1.626 m)        Physical Exam  GEN: Lying on left side on stretcher, elevated BMI  HEENT: NC/AT, no bruit  RESP: Clear lung to auscultation bilaterally   CV: Regular rate and rhythm, S1S2, no murmur  Skin:   Skin fold rashes on abdomen, buttock and breasts  Shallow skin lesions dispersed on abdomen, back, thighs and under breasts with different sizes and healing stages - some red and moist, some dry and present as healed scars  Back: Tenderness at lower back bilaterally  Knees: Tenderness (L > R) with warmth. No edema or erythema noted  BLE: Xerosis on feet to shins     Assessment and Plan:    Essential HTN  Advised to adhere to all HTN medication regimens, refilled today (7/24/2025)  Encourage to comply with recommended life style modification and diet restriction     HLD  Continue Lipitor 40 mg daily, refilled today (7/24/2025)  Encourage to comply with recommended dietary modification      DM  Continue metformin 500 mg BID, refilled today (7/24/2025)     Vitamin D deficiency  Continue ergocalciferol 50,000 unit weekly, refilled today (7/24/2025)     Chronic back pain   Start gabapentin 100 mg tid     Chronic knee pain   Start Norco 7.5 q6h x 5 days. Then will start tramadol 50 mg q4-6h PRN  Start prednisone 10 mg bid x 5 days  Continue Voltaren as needed  Augmentin 875-125 mg bid x 10 days    Skin erosion  Start fluconazole 100 mg daily x 7 days  Apply nystatin powder bid daily  Advised to use cotton to create barrier between skin folds to manage moisture and prevent " irritation    Xerosis, lower extremities  Advised to clean and dry. Then apply Vaseline at least twice daily    Urinary incontinence  Urology referral today (7/24/2025)    Home health request  Will find home health that accepts patient's insurance and can provide hospital bed, wheelchair and lift    Lab orders:  CBC, CMP, A1C, and vitamin D    Follow-up  Patient voices understanding and agrees to the plan discussed  All patient's questions have been answered at this time.   Follow up in about 3 months (around 10/24/2025) for routine visit and medication refill., sooner if needed.     Delroy Villagran MD  [facility], HO-1  07/24/2025

## 2025-07-24 ENCOUNTER — OFFICE VISIT (OUTPATIENT)
Dept: FAMILY MEDICINE | Facility: CLINIC | Age: 60
End: 2025-07-24
Payer: MEDICAID

## 2025-07-24 VITALS
RESPIRATION RATE: 18 BRPM | TEMPERATURE: 98 F | SYSTOLIC BLOOD PRESSURE: 146 MMHG | WEIGHT: 293 LBS | BODY MASS INDEX: 50.02 KG/M2 | HEART RATE: 64 BPM | HEIGHT: 64 IN | OXYGEN SATURATION: 95 % | DIASTOLIC BLOOD PRESSURE: 98 MMHG

## 2025-07-24 DIAGNOSIS — M17.0 BILATERAL PRIMARY OSTEOARTHRITIS OF KNEE: ICD-10-CM

## 2025-07-24 DIAGNOSIS — M48.061 SPINAL STENOSIS OF LUMBAR REGION WITHOUT NEUROGENIC CLAUDICATION: ICD-10-CM

## 2025-07-24 DIAGNOSIS — E55.9 VITAMIN D DEFICIENCY: ICD-10-CM

## 2025-07-24 DIAGNOSIS — N39.41 URGE INCONTINENCE OF URINE: ICD-10-CM

## 2025-07-24 DIAGNOSIS — E66.01 MORBID OBESITY: ICD-10-CM

## 2025-07-24 DIAGNOSIS — I10 PRIMARY HYPERTENSION: ICD-10-CM

## 2025-07-24 DIAGNOSIS — E11.9 DIABETES MELLITUS WITHOUT COMPLICATION: Primary | ICD-10-CM

## 2025-07-24 LAB
25(OH)D3+25(OH)D2 SERPL-MCNC: 22 NG/ML (ref 30–80)
ALBUMIN SERPL-MCNC: 3.2 G/DL (ref 3.4–4.8)
ALBUMIN/GLOB SERPL: 0.8 RATIO (ref 1.1–2)
ALP SERPL-CCNC: 65 UNIT/L (ref 40–150)
ALT SERPL-CCNC: 13 UNIT/L (ref 0–55)
ANION GAP SERPL CALC-SCNC: 11 MEQ/L
AST SERPL-CCNC: 11 UNIT/L (ref 11–45)
BASOPHILS # BLD AUTO: 0.05 X10(3)/MCL
BASOPHILS NFR BLD AUTO: 0.5 %
BILIRUB SERPL-MCNC: 0.5 MG/DL
BUN SERPL-MCNC: 13.7 MG/DL (ref 9.8–20.1)
CALCIUM SERPL-MCNC: 9.2 MG/DL (ref 8.4–10.2)
CHLORIDE SERPL-SCNC: 112 MMOL/L (ref 98–107)
CO2 SERPL-SCNC: 21 MMOL/L (ref 23–31)
CREAT SERPL-MCNC: 0.85 MG/DL (ref 0.55–1.02)
CREAT/UREA NIT SERPL: 16
EOSINOPHIL # BLD AUTO: 0.13 X10(3)/MCL (ref 0–0.9)
EOSINOPHIL NFR BLD AUTO: 1.4 %
ERYTHROCYTE [DISTWIDTH] IN BLOOD BY AUTOMATED COUNT: 15.3 % (ref 11.5–17)
EST. AVERAGE GLUCOSE BLD GHB EST-MCNC: 111.2 MG/DL
GFR SERPLBLD CREATININE-BSD FMLA CKD-EPI: >60 ML/MIN/1.73/M2
GLOBULIN SER-MCNC: 4.2 GM/DL (ref 2.4–3.5)
GLUCOSE SERPL-MCNC: 116 MG/DL (ref 82–115)
HBA1C MFR BLD: 5.5 %
HCT VFR BLD AUTO: 41.9 % (ref 37–47)
HGB BLD-MCNC: 13.2 G/DL (ref 12–16)
IMM GRANULOCYTES # BLD AUTO: 0.03 X10(3)/MCL (ref 0–0.04)
IMM GRANULOCYTES NFR BLD AUTO: 0.3 %
LYMPHOCYTES # BLD AUTO: 1.57 X10(3)/MCL (ref 0.6–4.6)
LYMPHOCYTES NFR BLD AUTO: 17.1 %
MCH RBC QN AUTO: 26.7 PG (ref 27–31)
MCHC RBC AUTO-ENTMCNC: 31.5 G/DL (ref 33–36)
MCV RBC AUTO: 84.8 FL (ref 80–94)
MONOCYTES # BLD AUTO: 0.4 X10(3)/MCL (ref 0.1–1.3)
MONOCYTES NFR BLD AUTO: 4.4 %
NEUTROPHILS # BLD AUTO: 7.01 X10(3)/MCL (ref 2.1–9.2)
NEUTROPHILS NFR BLD AUTO: 76.3 %
NRBC BLD AUTO-RTO: 0 %
PLATELET # BLD AUTO: 372 X10(3)/MCL (ref 130–400)
PMV BLD AUTO: 10.1 FL (ref 7.4–10.4)
POTASSIUM SERPL-SCNC: 3.3 MMOL/L (ref 3.5–5.1)
PROT SERPL-MCNC: 7.4 GM/DL (ref 5.8–7.6)
RBC # BLD AUTO: 4.94 X10(6)/MCL (ref 4.2–5.4)
SODIUM SERPL-SCNC: 144 MMOL/L (ref 136–145)
WBC # BLD AUTO: 9.19 X10(3)/MCL (ref 4.5–11.5)

## 2025-07-24 PROCEDURE — 85025 COMPLETE CBC W/AUTO DIFF WBC: CPT

## 2025-07-24 PROCEDURE — 82306 VITAMIN D 25 HYDROXY: CPT

## 2025-07-24 PROCEDURE — 99214 OFFICE O/P EST MOD 30 MIN: CPT | Mod: PBBFAC

## 2025-07-24 PROCEDURE — 80053 COMPREHEN METABOLIC PANEL: CPT

## 2025-07-24 PROCEDURE — 83036 HEMOGLOBIN GLYCOSYLATED A1C: CPT

## 2025-07-24 RX ORDER — NYSTATIN 100000 [USP'U]/G
POWDER TOPICAL 2 TIMES DAILY
Qty: 60 G | Refills: 1 | Status: SHIPPED | OUTPATIENT
Start: 2025-07-24

## 2025-07-24 RX ORDER — PREDNISONE 20 MG/1
20 TABLET ORAL 2 TIMES DAILY
Qty: 10 TABLET | Refills: 0 | Status: SHIPPED | OUTPATIENT
Start: 2025-07-24 | End: 2025-07-29

## 2025-07-24 RX ORDER — FLUCONAZOLE 100 MG/1
100 TABLET ORAL DAILY
Qty: 7 TABLET | Refills: 0 | Status: SHIPPED | OUTPATIENT
Start: 2025-07-24 | End: 2025-07-31

## 2025-07-24 RX ORDER — GABAPENTIN 100 MG/1
100 CAPSULE ORAL 3 TIMES DAILY
Qty: 90 CAPSULE | Refills: 11 | Status: SHIPPED | OUTPATIENT
Start: 2025-07-24 | End: 2026-07-24

## 2025-07-24 RX ORDER — ATORVASTATIN CALCIUM 40 MG/1
40 TABLET, FILM COATED ORAL DAILY
Qty: 90 TABLET | Refills: 3 | Status: SHIPPED | OUTPATIENT
Start: 2025-07-24

## 2025-07-24 RX ORDER — AMLODIPINE BESYLATE 10 MG/1
10 TABLET ORAL DAILY
Qty: 90 TABLET | Refills: 3 | Status: SHIPPED | OUTPATIENT
Start: 2025-07-24 | End: 2026-07-24

## 2025-07-24 RX ORDER — METFORMIN HYDROCHLORIDE 500 MG/1
500 TABLET ORAL 2 TIMES DAILY
Qty: 180 TABLET | Refills: 3 | Status: SHIPPED | OUTPATIENT
Start: 2025-07-24 | End: 2025-10-22

## 2025-07-24 RX ORDER — HYDROCHLOROTHIAZIDE 12.5 MG/1
12.5 CAPSULE ORAL DAILY
Qty: 90 CAPSULE | Refills: 3 | Status: SHIPPED | OUTPATIENT
Start: 2025-07-24

## 2025-07-24 RX ORDER — AMOXICILLIN AND CLAVULANATE POTASSIUM 875; 125 MG/1; MG/1
1 TABLET, FILM COATED ORAL EVERY 12 HOURS
Qty: 20 TABLET | Refills: 0 | Status: SHIPPED | OUTPATIENT
Start: 2025-07-24 | End: 2025-08-03

## 2025-07-24 RX ORDER — VALSARTAN 320 MG/1
320 TABLET ORAL DAILY
Qty: 90 TABLET | Refills: 3 | Status: SHIPPED | OUTPATIENT
Start: 2025-07-24 | End: 2026-07-24

## 2025-07-24 RX ORDER — HYDROCODONE BITARTRATE AND ACETAMINOPHEN 7.5; 325 MG/1; MG/1
1 TABLET ORAL EVERY 6 HOURS PRN
Qty: 20 TABLET | Refills: 0 | Status: SHIPPED | OUTPATIENT
Start: 2025-07-24 | End: 2025-07-29

## 2025-07-24 RX ORDER — ERGOCALCIFEROL 1.25 MG/1
50000 CAPSULE ORAL
Qty: 12 CAPSULE | Refills: 1 | Status: SHIPPED | OUTPATIENT
Start: 2025-07-24

## 2025-07-24 RX ORDER — FLUTICASONE FUROATE 100 UG/1
1 POWDER RESPIRATORY (INHALATION)
Qty: 30 EACH | Refills: 1 | Status: SHIPPED | OUTPATIENT
Start: 2025-07-24 | End: 2025-10-22

## 2025-07-24 NOTE — LETTER
July 26, 2025    Rosalee Prince  [address] [place] [address]             [facility]  [address]  [place] [address]  Phone: [phone]   Hospital bed - bariatric  Wheelchair - extended size/bariatric  Lift - unsure if [facility] can accommodate, pt reported there is other one that the patient can pull up on, she is capable    Length - 99    Dx  1. Diabetes mellitus without complication    2. Primary hypertension    3. Urge incontinence of urine    4. Vitamin D deficiency    5. Bilateral primary osteoarthritis of knee    6. Spinal stenosis of lumbar region without neurogenic claudication    7. BMI 60.0-69.9, adult    8. Morbid obesity              If you have any questions or concerns, please don't hesitate to call.    Sincerely,        July Saunders MD  [facility] Family [facility]  NPI [id number]

## 2025-07-25 ENCOUNTER — TELEPHONE (OUTPATIENT)
Dept: FAMILY MEDICINE | Facility: CLINIC | Age: 60
End: 2025-07-25
Payer: MEDICAID

## 2025-07-25 NOTE — TELEPHONE ENCOUNTER
----- Message from Lida sent at 7/25/2025  9:29 AM CDT -----  Regarding: Prescription  Patient's sister called to request a prescription for bed and wheelchair be sent to [facility] along with diagnosis and notes as well.  Please call [phone].  Thank You.

## 2025-07-26 NOTE — TELEPHONE ENCOUNTER
Please print letter for me to sign and recent clinic note and send to [facility] once I sign it, please let family know once sent and let her know to call us in 2 weeks if anything not received or if any problems with the paper work so we can trouble shoot. Please check with [facility] to be sure they receive and if they need anything else  Thanks

## 2025-07-26 NOTE — PROGRESS NOTES
I reviewed History, PE, A/P and medical record.  Services provided in outpatient department of a teaching hospital/facility, I was immediately available.  I agree with resident, care reasonable and necessary with any exceptions stated below.  I evaluated the patient with resident at time of visit, participated in key parts of H/P and management was discussed.    DM HTN addressed, controlled, BP due to pain, HLD - uncontrolled but need to verify adherence, suspect gaps  Unable to update routine prev services until we can get pain and acute issues under control    Extended f2f time > 20 minutes, I ma meeting patient for first time  Pt found lying on left side on ambulance stretcher in moderate pain  Issues in past 6 months with allowing others to care for her but that seems better now  Here with caregiver who is very attentive and concerned, patient serge to provide all of her own h/o    Discussed doing virtual visits and she will let family know and understands they can request that option at any time, even for acute issues, she will let family know, at FU need to explore if anyone has HC POA    High BMI severely limits patient mobility. She has polyarthralgia but worse left knee, lower back. Probably has fibromyalgia at this point. Significant central and foraminal spinal stenosis noted no CT L spine 12/2023    We need to call pharmacy for last 3 fill dates and quantities of all meds, likely can stop metformin    Agree with need for hospital bed. Patient is unable to any any weight due to wide spread chronic pain and high BMI. Family has to change and bathe in bed. She has urinary incontinence that leaks up her back and is causing rash at high risk for skin break down. She needs frequent. The bed is needed for positioning the patient to relieve pain, promote proper body alignment, or prevent complications like respiratory infections or pressure sores. The patient needs a bed with variable height adjustments to  facilitate transfers and for bathing changing and routine care. Needs to adjust legs to alleviate knee pain and swelling.    Patient denies CP, SOB, alter in BM, no blood in urine or stool, denies GERD/dysphagia    Alert, discomfort due to pain and hard to fit on ambulance stretches  Lungs CTA, no distress  CVS RRR  Lspine mild tend lower l spine, unable to move patient to examine, NV intact distally    UE strength is good -able to pull her self up  Skin redness, few satellite lesions under breasts BL, sone skin fissuring  Itchy Red papules, ? Perifollicular scattered on flank, some excoriated and crusted, pt feels it is from urine leakage, ? Bites vs staph    Left knee - min warmth , no redness, tender med/lat line line w/o effusion, cannot assess ROM due to pain and position, Able to move some voluntarily, do not suspect septic joint      Advised ED if condition not improved for intractable pain and further workup  Ideally would have MRI L spine but would have to see if BB open MRI could accommodate, and pain management    Plan  Verify med adherence to all meds  Hospital bed, wheelchair, lift  Augmentin for infected skin lesions, Diflucan/Nystatin for intertrigo and cover with dry cotton cloth  Pred 20 BID for knee pain - monitor for response, she has not autoimmune labs and it is challenging to know what s/s related to high Bmi vs disease process  Consider repeat L spine imaging and pain management eval for LBP, pt used to be ambulatory but pain precludes now        July Saunders MD  [facility] Family Medicine Residency - [place]

## 2025-07-27 ENCOUNTER — TELEPHONE (OUTPATIENT)
Dept: FAMILY MEDICINE | Facility: CLINIC | Age: 60
End: 2025-07-27
Payer: MEDICAID

## 2025-07-27 DIAGNOSIS — M17.0 BILATERAL PRIMARY OSTEOARTHRITIS OF KNEE: Primary | ICD-10-CM

## 2025-07-27 RX ORDER — TRAMADOL HYDROCHLORIDE 50 MG/1
50 TABLET, FILM COATED ORAL EVERY 6 HOURS PRN
Qty: 20 TABLET | Refills: 0 | Status: SHIPPED | OUTPATIENT
Start: 2025-07-27

## 2025-07-27 NOTE — TELEPHONE ENCOUNTER
I called patient. No response. I called sister, May. I acknowledged her that I prescribed Tramadol 50 mg q6h prn x 20 tabs. She voiced understand and appreciated.     Delroy Villagran MD  [facility], [facility], -1  07/27/2025

## 2025-08-12 ENCOUNTER — TELEPHONE (OUTPATIENT)
Dept: FAMILY MEDICINE | Facility: CLINIC | Age: 60
End: 2025-08-12
Payer: MEDICAID

## 2025-08-21 ENCOUNTER — TELEPHONE (OUTPATIENT)
Dept: FAMILY MEDICINE | Facility: CLINIC | Age: 60
End: 2025-08-21
Payer: MEDICAID